# Patient Record
Sex: MALE | Race: WHITE | NOT HISPANIC OR LATINO | Employment: OTHER | URBAN - NONMETROPOLITAN AREA
[De-identification: names, ages, dates, MRNs, and addresses within clinical notes are randomized per-mention and may not be internally consistent; named-entity substitution may affect disease eponyms.]

---

## 2017-01-01 ENCOUNTER — TELEPHONE (OUTPATIENT)
Dept: UROLOGY | Facility: CLINIC | Age: 82
End: 2017-01-01

## 2017-01-01 ENCOUNTER — ANTI-COAG VISIT (OUTPATIENT)
Dept: CARDIOLOGY | Age: 82
End: 2017-01-01
Payer: MEDICARE

## 2017-01-01 DIAGNOSIS — N30.01 ACUTE CYSTITIS WITH HEMATURIA: ICD-10-CM

## 2017-01-01 DIAGNOSIS — Z95.0 PACEMAKER: Primary | ICD-10-CM

## 2017-01-01 DIAGNOSIS — N32.81 OAB (OVERACTIVE BLADDER): ICD-10-CM

## 2017-01-01 DIAGNOSIS — I48.20 ATRIAL FIBRILLATION, CHRONIC (HCC): ICD-10-CM

## 2017-01-01 DIAGNOSIS — I48.20 CHRONIC ATRIAL FIBRILLATION (HCC): ICD-10-CM

## 2017-01-01 DIAGNOSIS — I10 ESSENTIAL HYPERTENSION: ICD-10-CM

## 2017-01-01 DIAGNOSIS — R97.20 ELEVATED PROSTATE SPECIFIC ANTIGEN (PSA): Primary | ICD-10-CM

## 2017-01-01 LAB
INTERNATIONAL NORMALIZATION RATIO, POC: 3.1
PROTHROMBIN TIME, POC: NORMAL

## 2017-01-01 PROCEDURE — 93296 REM INTERROG EVL PM/IDS: CPT | Performed by: CLINICAL NURSE SPECIALIST

## 2017-01-01 PROCEDURE — 85610 PROTHROMBIN TIME: CPT | Performed by: CLINICAL NURSE SPECIALIST

## 2017-01-01 PROCEDURE — 93294 REM INTERROG EVL PM/LDLS PM: CPT | Performed by: CLINICAL NURSE SPECIALIST

## 2017-01-01 RX ORDER — FINASTERIDE 5 MG/1
5 TABLET, FILM COATED ORAL DAILY
Qty: 90 TABLET | Refills: 3 | Status: SHIPPED | OUTPATIENT
Start: 2017-01-01 | End: 2018-01-01 | Stop reason: SDUPTHER

## 2017-01-01 RX ORDER — AMLODIPINE BESYLATE 10 MG/1
10 TABLET ORAL DAILY
Qty: 30 TABLET | Refills: 0 | Status: SHIPPED | OUTPATIENT
Start: 2017-01-01 | End: 2018-01-01 | Stop reason: SDUPTHER

## 2017-01-05 ENCOUNTER — ANTI-COAG VISIT (OUTPATIENT)
Dept: CARDIOLOGY | Age: 82
End: 2017-01-05
Payer: MEDICARE

## 2017-01-05 DIAGNOSIS — I48.20 CHRONIC ATRIAL FIBRILLATION (HCC): ICD-10-CM

## 2017-01-05 LAB
INTERNATIONAL NORMALIZATION RATIO, POC: 3
PROTHROMBIN TIME, POC: NORMAL

## 2017-01-05 PROCEDURE — 85610 PROTHROMBIN TIME: CPT | Performed by: CLINICAL NURSE SPECIALIST

## 2017-02-09 ENCOUNTER — ANTI-COAG VISIT (OUTPATIENT)
Dept: CARDIOLOGY | Age: 82
End: 2017-02-09
Payer: MEDICARE

## 2017-02-09 DIAGNOSIS — I48.20 CHRONIC ATRIAL FIBRILLATION (HCC): ICD-10-CM

## 2017-02-09 LAB
INTERNATIONAL NORMALIZATION RATIO, POC: 1.9
PROTHROMBIN TIME, POC: NORMAL

## 2017-02-09 PROCEDURE — 85610 PROTHROMBIN TIME: CPT | Performed by: NURSE PRACTITIONER

## 2017-02-09 PROCEDURE — 1036F TOBACCO NON-USER: CPT | Performed by: NURSE PRACTITIONER

## 2017-02-17 DIAGNOSIS — E03.8 OTHER SPECIFIED HYPOTHYROIDISM: Primary | ICD-10-CM

## 2017-02-17 RX ORDER — LEVOTHYROXINE SODIUM 0.1 MG/1
100 TABLET ORAL DAILY
Qty: 90 TABLET | Refills: 1 | Status: SHIPPED | OUTPATIENT
Start: 2017-02-17 | End: 2017-06-06 | Stop reason: SDUPTHER

## 2017-02-21 ENCOUNTER — TELEPHONE (OUTPATIENT)
Dept: UROLOGY | Facility: CLINIC | Age: 82
End: 2017-02-21

## 2017-02-21 ENCOUNTER — OFFICE VISIT (OUTPATIENT)
Dept: FAMILY MEDICINE CLINIC | Facility: CLINIC | Age: 82
End: 2017-02-21

## 2017-02-21 VITALS
SYSTOLIC BLOOD PRESSURE: 132 MMHG | WEIGHT: 169 LBS | BODY MASS INDEX: 27.16 KG/M2 | HEART RATE: 74 BPM | RESPIRATION RATE: 20 BRPM | HEIGHT: 66 IN | TEMPERATURE: 97.7 F | DIASTOLIC BLOOD PRESSURE: 74 MMHG | OXYGEN SATURATION: 95 %

## 2017-02-21 DIAGNOSIS — Z78.9 NON-SMOKER: ICD-10-CM

## 2017-02-21 DIAGNOSIS — N13.8 BPH (BENIGN PROSTATIC HYPERTROPHY) WITH URINARY OBSTRUCTION: Primary | ICD-10-CM

## 2017-02-21 DIAGNOSIS — N40.1 BPH (BENIGN PROSTATIC HYPERTROPHY) WITH URINARY OBSTRUCTION: Primary | ICD-10-CM

## 2017-02-21 DIAGNOSIS — Z02.79 ENCOUNTER FOR ISSUANCE OF MEDICAL CERTIFICATE: Primary | ICD-10-CM

## 2017-02-21 PROCEDURE — 99213 OFFICE O/P EST LOW 20 MIN: CPT | Performed by: NURSE PRACTITIONER

## 2017-02-21 RX ORDER — FINASTERIDE 5 MG/1
5 TABLET, FILM COATED ORAL DAILY
Qty: 90 TABLET | Refills: 3 | Status: SHIPPED | OUTPATIENT
Start: 2017-02-21 | End: 2017-01-01 | Stop reason: SDUPTHER

## 2017-02-21 NOTE — PROGRESS NOTES
Sam Farr is a 89 y.o. male presents who today with   Chief Complaint   Patient presents with   • [facility] paperwork        No Known Allergies    HPI: Encounter for issuance of medical certificate  Presents with wife for completion for housebound status or permanent need for regular aid and attendee from [facility].  I explained to pt and wife that I do not think they qualify for this program because he has to be home bound.  He does have difficulty with certain tasks.  She wants to get this filled out and into the system.  He tells me that he needs assistance with cooking, and financial matters, and he is hard of hearing.  He says he just can't cook anymore.  He does take his own bath but wife does all the laundry he says he can not do it.  Has multiple chronic problems:  Osteoporosis stable with fosamax, HTN stable with amlodipine, iron deficiency stable with ferrous sulfate, hypothyroidism stable with levothyroxine, BPH stable with flomax, and Afib stable with coumadin and metoprolol.      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   • History of anticoagulant therapy    • HTN (hypertension)    • Hx of colon cancer, stage III    • Hypothyroidism    • Iron deficiency anemia    • Osteoporosis    • Pacemaker      Past Surgical History   Procedure Laterality Date   • Colon surgery       colon cancer stage 3   • Tympanoplasty       replaced   • Pacemaker implantation  2006   • Pacemaker replacement  2012     Social History     Social History   • Marital status:      Spouse name: N/A   • Number of children: N/A   • Years of education: N/A     Social History Main Topics   • Smoking status: Never Smoker   • Smokeless tobacco: Never Used   • Alcohol use Yes      Comment: wine  occassionally   • Drug use: No   • Sexual activity: Defer     Other Topics Concern   • None     Social History Narrative     Family History   Problem Relation Age of Onset   • Colon cancer Mother    • Heart attack Father    • COPD Sister    • No Known Problems Daughter    • Cancer Son    • Appendicitis Maternal Grandmother    • Heart attack Maternal Grandfather    • No Known Problems Paternal Grandmother    • No Known Problems Paternal Grandfather    • No Known Problems Sister    • No Known Problems Sister    • No Known Problems Sister    • Heart attack Son    • No Known Problems Son    • No Known Problems Son    • No Known Problems Daughter    • No Known Problems Daughter    • No Known Problems Daughter        Current Outpatient Prescriptions on File Prior to Visit   Medication Sig Dispense Refill   • alendronate (FOSAMAX) 70 MG tablet TAKE 1 TABLET EVERY WEEK 12 tablet 1   • amLODIPine (NORVASC) 10 MG tablet TAKE 1 TABLET EVERY DAY 90 tablet 1   • Calcium-Vitamin D (CALTRATE 600 PLUS-VIT D PO) Take  by mouth daily.     • dutasteride (AVODART) 0.5 MG capsule Daily.     • ferrous sulfate 325 (65 FE) MG tablet Take 325 mg by mouth daily with breakfast.     • FINASTERIDE PO Take 0.5 mg by mouth daily.     • levothyroxine (SYNTHROID, LEVOTHROID) 100 MCG tablet Take 1 tablet by mouth Daily. 90 tablet 1   • metoprolol tartrate (LOPRESSOR) 50 MG tablet Take 50 mg by mouth 2 (two) times a day. 1/2 tab bid     • tamsulosin (FLOMAX) 0.4 MG capsule 24 hr capsule Take 1 capsule by mouth daily.     • warfarin (COUMADIN) 5 MG tablet Take 5 mg by mouth daily. Dr. Small manages coumadin.       No current facility-administered medications on file prior to visit.         REVIEW OF SYMPTOMS: (Positives bolded)  General:  weight loss, fever, chills, night sweats, fatigue, appetite loss  HEENT:  blurry vision, eye pain, eye discharge, dry eyes, decreased hearing,   Respiratory: shortness of breath, cough, hemoptysis, wheezing,  "pleurisy,   Cardiovascular:  chest pain, PND, palpitation, edema, orthopnea, syncope, swelling of extremities  Gastro: Nausea, vomiting, diarrhea, hematemesis, abdominal pain, constipation  Genito: hematuria, dysuria, glycosuria, hesitancy, frequency, incontinence  Neuro:  Migraine, numbness, ataxia, tremor, vertigo, weakness, memory loss,  \"All other systems reviewed and negative, except as listed above.”    OBJECTIVE:  Constitutional:  Appearance-No acute distress, Consistent with stated age. Orientation- Oriented x 3, alert Posture-Not doubled over. Gait-Normal pace, normal arm movement with a cane. Posture- Normal Build and Nutrition-Well developed and well nourished.  General- Patient is pleasant and cooperative with the interview and exam.    Integumentary: General-No rashes, ulcers or lesions. No edema.  Palpation- Normal skin moisture/turgor. Skin is warm to touch, no increased warmth. Capillary refill is normal bilateral Upper and lower extremity.     Head/Neck: Head- normocephalic and atraumatic.  Neck- without visible/palpable lumps or pulsations.  Palpation- No bony tenderness about head/neck along frontal, occiptial, temporal, parietal, mastoid, jawline, zygoma, orbit or any other location.  NO temporal artery tenderness. No TMJ tenderness. Normal cervical ROM.   Neck Supple.  Thyroid-No thyromegaly, no nodules    Eye: Bilaterally PERRLA, EOMI.  No discharge.  Upper and lower eyelids are normal. Sclera/conjunctiva normal without discharge. Cornea is normal and clear. Lens is normal.  Eyeball appears normal. No ciliary flushing, no conjunctival injection.    ENMT:  Pinna- normal without tenderness or erythema.  External auditory canal Left- normal without erythema or discharge, no excessive cerumen. External auditory canal Right-normal without erythema or discharge, no excessive cerumen. TM left- Grey/pearly, normal light reflex and anatomy TM Right- Grey/pearly, normal light reflex and anatomy Hearing " Assessment-normal to conversational speech at 2-5 feet.  Nose and sinus-No sinus tenderness along frontal/maxillary region. External appearance normal and midline. Nares- bilateral quiet airflow, no discharge. Nasal mucosa- No bleeding noted and no ulcerations observed. Pink, moist. Turbinates non boggy. Lips- normal color, moist without cracks/lesions Oral Cavity/Palate- hard/soft palate intact without lesions, oral mucosa pink and moist. Dentition assessed and discussed appropriate oral care. Tongue normal midline.  Oropharynx- no pharyngeal erythema, Uvula midline. No post nasal drip. No exudate. Salivary glands- Non tender to palpation    CHEST/LUNG: Inspection- symmetric chest wall no pectus deformity. Normal effort, no distress, no use of accessory muscles. Palpation- nontender sternum, ribline.  No abnormal pulsations. Auscultation- Breath sounds normal throughout all lung fields.  Normal tracheal sounds, Normal bronchial sounds overlying sternum, Bronchovessicular sounds normal between scapulae posteriorly, Normal vessicular breath sounds heard throughout periphery. Lungs are clear today. Adventitious sounds- No wheezes, rales, rhonchi.     CARDIOVASCULAR:  Carotid artery- normal, no bruits or abnormal pulsations. Jugular vein- no pulsations. Palpation/Percussion- Normal PMI, no palpable thrill  Auscultation- Regular rate and rhythm. No murmur noted in sitting, supine positions. Extremities- no digital clubbing, cyanosis, edema, increased warmth.    Peripheral Vascular: Upper extremity Left- Normal temperature with pink nailbeds and no ulcerations.  Upper extremity Right- Normal temperature with pink nailbeds and no ulcerations.  Lower extremity- Normal temperature with pink nailbeds and no ulcerations. DP pulses 2+ bilaterally.  Pedal hair intact.  Normal capillary refill. Edema- No edema.    Musculoskeletal: Generalized-No generalized swelling or edema of extremities, no digital clubbing or cyanosis,  neurovascularly intact all four extremities.  Upper extremity- Symmetrical posture.  No visible deformity.  Normal sensation along medial and lateral upper extremity proximally and distally.  NO tenderness overlying shoulder, lateral/medial epicondyle.  5/5 and strength 5/5 bilateral UE.  Elbow palpated, no tenderness overlying olecranon.  Normal supination, pronation to active/passive ROM and to resisted rotation. Bicep insertion/tricep insertion appear normal without obvious pathology. Rotator cuff evaluated and intact.  Normal wrist ROM bilaterally. Normal hand movement, intrinsic muscles of hands normal. No tenderness to palpation of hands/wrists/elbows.  Lower extremity- Not tender to palpation, no pain, no swelling, edema or erythema of surrounding tissue, normal strength and tone.  Normal appearing hip ROM bilaterally without pain.  Knee ROM normal at 0-120 degrees. No tenderness overlying trochanters, no tenderness about patella, quad tendon, patellar tendon.  No tenderness at tibial tuberosity. Ankle normal ROM not tender to palpation along medial/lateral malleolus. Normal movement of toes, no tenderness bilateral feet/toes.  Normal foot type. Calves symmetrical.  Stretching demonstrated today.  Spine/Ribs- No deformities, masses or tenderness, no known fractures, normal strength, Normal ROM. Normal stability No tenderness along C/T/L spine.  Normal appearing ROM about spine.      Neurological: General- Moves all 4 extremities symmetrically. Symmetrical face and body posture. Cranial nerves- individually evaluated II-XII and intact. PERRLA, Normal EOMI, visual/special senses appear intact, Face is symmetrical and normal sensation/movement, normal tongue, normal strength/posture of neck musculature. Balance- Romberg intact.  Reflexes- ntact with DTR 2+ patellar, Achilles, bicep, brachial,tricep. Ankle clonus normal with 2 beats.  Strength- 5/5 bilateral UE and LE. Soft touch- intact bilateral UE and LE.   Temperature sensation- intact bilateral UE and LE. Cerebellar testing-Rapid alternating movements intact.  Heel shin intact. Able to walk normal gait, normal heel toe walking. Neck- supple.      Neuropsych: Oriented- Person, place, time. (AAOx3), Mood/affect- normal and congruent. Able to articulate well. Speech-Normal speech, normal rate, normal tone, normal use of language, volume and coherence.  Thought content- normal with ability to perform basic computations and apply abstract thought/reason. Associations- intact, no SI/HI, no hallucinations, delusions, obsessions.  Judgment/insight- Appropriate. Memory-Recall intact, remote and recent memory intact. Knowledge- Age appropriate fund of knowledge, concentration and attention span normal.    Lymphatic: Head/Neck- normal size and non tender to palpation. Axillary- Head and neck LN are normal size and non tender to palpation. Femoral and Inguinal- normal size and non tender to palpation.      Assessment/Plan:  Sam was seen today for [facility] paperwork.    Diagnoses and all orders for this visit:    Encounter for issuance of medical certificate:  See scanned sheet    Non-smoker     MMSE, Functional Capacity, PHQ-9 all completed see flow sheets    Return if symptoms worsen or fail to improve., 6 months for chronic problems    Lisbet Whyte, DNP, APRN-BC  02/21/2017

## 2017-02-21 NOTE — TELEPHONE ENCOUNTER
Called and spoke with patient wife and she says that they do get a 90 day supply at [facility] mail order.  Because she did advise to me that it is cheaper for them that way through a mail order. ML

## 2017-02-22 ENCOUNTER — TELEPHONE (OUTPATIENT)
Dept: CARDIOLOGY | Age: 82
End: 2017-02-22

## 2017-02-23 DIAGNOSIS — I48.20 ATRIAL FIBRILLATION, CHRONIC (HCC): ICD-10-CM

## 2017-02-23 DIAGNOSIS — Z95.0 PACEMAKER: Primary | ICD-10-CM

## 2017-02-23 PROCEDURE — 93296 REM INTERROG EVL PM/IDS: CPT | Performed by: NURSE PRACTITIONER

## 2017-02-23 PROCEDURE — 93294 REM INTERROG EVL PM/LDLS PM: CPT | Performed by: NURSE PRACTITIONER

## 2017-03-01 ENCOUNTER — OFFICE VISIT (OUTPATIENT)
Dept: FAMILY MEDICINE CLINIC | Facility: CLINIC | Age: 82
End: 2017-03-01

## 2017-03-01 VITALS
RESPIRATION RATE: 14 BRPM | TEMPERATURE: 97.6 F | WEIGHT: 166.8 LBS | BODY MASS INDEX: 26.81 KG/M2 | HEIGHT: 66 IN | DIASTOLIC BLOOD PRESSURE: 64 MMHG | OXYGEN SATURATION: 97 % | HEART RATE: 74 BPM | SYSTOLIC BLOOD PRESSURE: 106 MMHG

## 2017-03-01 DIAGNOSIS — L08.9 SKIN INFECTION: Primary | ICD-10-CM

## 2017-03-01 PROCEDURE — 99213 OFFICE O/P EST LOW 20 MIN: CPT | Performed by: NURSE PRACTITIONER

## 2017-03-01 RX ORDER — AMOXICILLIN AND CLAVULANATE POTASSIUM 875; 125 MG/1; MG/1
1 TABLET, FILM COATED ORAL 2 TIMES DAILY
Qty: 14 TABLET | Refills: 0 | Status: SHIPPED | OUTPATIENT
Start: 2017-03-01 | End: 2017-03-08

## 2017-03-01 NOTE — PROGRESS NOTES
Chief Complaint   Patient presents with   • Follow-up     Patient said that his belly button have had some discharge from it. He said it has been like this for a couple weeks.         No Known Allergies    HPI: Sam Farr is a 89 y.o. male presents today with complaints of drainage and pain from his belly button that started last week.  He didn't think it was anything and has been putting antibiotic ointment but it continues to worsen and today the whole belly button and area around belly button is red, warm, and tender.  Rates overall 7/10.  Has multiple chronic problems such as: atrial fib stable with  Warfarin and metoprolol, BPH stable with tamsulosin, hypothyroidism stable with levopthyrioxine, HTN stalbe with amlodipine, iron def anemia stable with ferrous calcium + D, and avodart and finasteride.      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   • History of anticoagulant therapy    • HTN (hypertension)    • Hx of colon cancer, stage III    • Hypothyroidism    • Iron deficiency anemia    • Osteoporosis    • Pacemaker      Past Surgical History   Procedure Laterality Date   • Colon surgery       colon cancer stage 3   • Tympanoplasty       replaced   • Pacemaker implantation  2006   • Pacemaker replacement  2012     Social History     Social History   • Marital status:      Spouse name: N/A   • Number of children: N/A   • Years of education: N/A     Social History Main Topics   • Smoking status: Never Smoker   • Smokeless tobacco: Never Used   • Alcohol use Yes      Comment: wine occassionally   • Drug use: No   • Sexual activity: Defer     Other Topics Concern   • None     Social History Narrative     Family History   Problem Relation Age of Onset   • Colon cancer  Mother    • Heart attack Father    • COPD Sister    • No Known Problems Daughter    • Cancer Son    • Appendicitis Maternal Grandmother    • Heart attack Maternal Grandfather    • No Known Problems Paternal Grandmother    • No Known Problems Paternal Grandfather    • No Known Problems Sister    • No Known Problems Sister    • No Known Problems Sister    • Heart attack Son    • No Known Problems Son    • No Known Problems Son    • No Known Problems Daughter    • No Known Problems Daughter    • No Known Problems Daughter        Current Outpatient Prescriptions on File Prior to Visit   Medication Sig Dispense Refill   • alendronate (FOSAMAX) 70 MG tablet TAKE 1 TABLET EVERY WEEK 12 tablet 1   • amLODIPine (NORVASC) 10 MG tablet TAKE 1 TABLET EVERY DAY 90 tablet 1   • Calcium-Vitamin D (CALTRATE 600 PLUS-VIT D PO) Take  by mouth daily.     • dutasteride (AVODART) 0.5 MG capsule Daily.     • ferrous sulfate 325 (65 FE) MG tablet Take 325 mg by mouth daily with breakfast.     • finasteride (PROSCAR) 5 MG tablet Take 1 tablet by mouth Daily. 90 tablet 3   • levothyroxine (SYNTHROID, LEVOTHROID) 100 MCG tablet Take 1 tablet by mouth Daily. 90 tablet 1   • metoprolol tartrate (LOPRESSOR) 50 MG tablet Take 50 mg by mouth 2 (two) times a day. 1/2 tab bid     • tamsulosin (FLOMAX) 0.4 MG capsule 24 hr capsule Take 1 capsule by mouth daily.     • warfarin (COUMADIN) 5 MG tablet Take 5 mg by mouth daily. Dr. Small manages coumadin.       No current facility-administered medications on file prior to visit.         REVIEW OF SYMPTOMS: (Positives bolded)  General:  weight loss, fever, chills, night sweats, fatigue, appetite loss  HEENT:  blurry vision, eye pain, eye discharge, dry eyes, decreased vision, sore throat tinnitus, bloody nose, hearin gloss, sinus pain/pressure, ear pain/pressure.   Respiratory: shortness of breath, cough, hemoptysis, wheezing, pleurisy,   Cardiovascular:  chest pain, PND, palpitation, edema, orthopnea,  "syncope, swelling of extremities  Skin:  rash, pruritis, sores, nail changes, skin thickening, change in wart/mole, itching, rash, new lesions, pruritus, nail changes  Neuro:  Migraine, numbness, ataxia, tremor, vertigo, weakness, memory loss,  \"All other systems reviewed and negative, except as listed above.”    OBJECTIVE:  Constitutional:  Appearance-No acute distress, Consistent with stated age. Orientation- Oriented x 3, alert Posture-Not doubled over. Gait-Normal pace, normal arm movement. Posture- Normal Build and Nutrition-Well developed and well nourished.  General- Patient is pleasant and cooperative with the interview and exam.    Integumentary: General-No rashes, ulcers or lesions. No edema.  Palpation- Normal skin moisture/turgor. Skin around the umbilicus is red and warm to touch, there is purulent  Drainage coming from the umbilicus.  Skin culture for diatharix was obtained.     CHEST/LUNG: Inspection- symmetric chest wall no pectus deformity. Normal effort, no distress, no use of accessory muscles. Palpation- nontender sternum, ribline.  No abnormal pulsations. Auscultation- Breath sounds normal throughout all lung fields.  Normal tracheal sounds, Normal bronchial sounds overlying sternum, Bronchovessicular sounds normal between scapulae posteriorly, Normal vessicular breath sounds heard throughout periphery. Lungs are clear today. Adventitious sounds- No wheezes, rales, rhonchi.     CARDIOVASCULAR:  Carotid artery- normal, no bruits or abnormal pulsations. Jugular vein- no pulsations. Palpation/Percussion- Normal PMI, no palpable thrill  Auscultation- Regular rate and rhythm. No murmur noted in sitting, supine positions. Extremities- no digital clubbing, cyanosis, edema, increased warmth.    ABDOMEN: Inspection- normal and no visible pulsations. Normal contour. Auscultation- Bowel sounds normal, no abdominal bruits. Palpation/Percussion- soft, tender around umbilicus, no rebound tenderness, no " rigidity (guarding), no jar tenderness, no masses. Has purulent drainage from the umbilicus.   Liver-no hepatomegaly, Spleen - no splenomegaly, Hernias- none. Rectal not examined.     Peripheral Vascular: Upper extremity Left- Normal temperature with pink nailbeds and no ulcerations.  Upper extremity Right- Normal temperature with pink nailbeds and no ulcerations.  Lower extremity- Normal temperature with pink nailbeds and no ulcerations. DP pulses 2+ bilaterally.  Pedal hair intact.  Normal capillary refill. Edema- No edema.    Lymphatic: Head/Neck- normal size and non tender to palpation. Axillary- Head and neck LN are normal size and non tender to palpation. Femoral and Inguinal- normal size and non tender to palpation.      Assessment/Plan:  Sam was seen today for follow-up.    Diagnoses and all orders for this visit:    Skin infection Umbilicus  -     amoxicillin-clavulanate (AUGMENTIN) 875-125 MG per tablet; Take 1 tablet by mouth 2 (Two) Times a Day for 7 days.       -   Use dish soap and guaze and clean area the area twice daily.  Apply antibiotic ointment of choice to affected area       -  Use good hand washing techniques    Return in about 1 week (around 3/8/2017), or if symptoms worsen or fail to improve.    Lisbet Whyte, DNP, APRN-BC  03/01/2017

## 2017-03-09 ENCOUNTER — ANTI-COAG VISIT (OUTPATIENT)
Dept: CARDIOLOGY | Age: 82
End: 2017-03-09

## 2017-03-09 DIAGNOSIS — I48.20 CHRONIC ATRIAL FIBRILLATION (HCC): ICD-10-CM

## 2017-03-09 LAB
INTERNATIONAL NORMALIZATION RATIO, POC: 2
PROTHROMBIN TIME, POC: NORMAL

## 2017-03-09 PROCEDURE — 1036F TOBACCO NON-USER: CPT | Performed by: CLINICAL NURSE SPECIALIST

## 2017-03-09 PROCEDURE — 99999 PR OFFICE/OUTPT VISIT,PROCEDURE ONLY: CPT | Performed by: CLINICAL NURSE SPECIALIST

## 2017-03-31 DIAGNOSIS — C18.9 MALIGNANT NEOPLASM OF COLON, UNSPECIFIED SITE: Primary | ICD-10-CM

## 2017-04-03 ENCOUNTER — LAB (OUTPATIENT)
Dept: ONCOLOGY | Facility: CLINIC | Age: 82
End: 2017-04-03

## 2017-04-03 ENCOUNTER — OFFICE VISIT (OUTPATIENT)
Dept: ONCOLOGY | Facility: CLINIC | Age: 82
End: 2017-04-03

## 2017-04-03 VITALS
DIASTOLIC BLOOD PRESSURE: 68 MMHG | TEMPERATURE: 97.1 F | HEIGHT: 66 IN | RESPIRATION RATE: 18 BRPM | OXYGEN SATURATION: 94 % | HEART RATE: 79 BPM | SYSTOLIC BLOOD PRESSURE: 118 MMHG | WEIGHT: 168.4 LBS | BODY MASS INDEX: 27.06 KG/M2

## 2017-04-03 DIAGNOSIS — C18.9 MALIGNANT NEOPLASM OF COLON, UNSPECIFIED SITE: ICD-10-CM

## 2017-04-03 DIAGNOSIS — C18.9 MALIGNANT NEOPLASM OF COLON, UNSPECIFIED PART OF COLON (HCC): Primary | ICD-10-CM

## 2017-04-03 DIAGNOSIS — C18.2 MALIGNANT NEOPLASM OF ASCENDING COLON (HCC): Primary | ICD-10-CM

## 2017-04-03 DIAGNOSIS — C18.9 MALIGNANT NEOPLASM OF COLON, UNSPECIFIED SITE: Primary | ICD-10-CM

## 2017-04-03 LAB
ALBUMIN SERPL-MCNC: 4.6 G/DL (ref 3.5–5)
ALBUMIN/GLOB SERPL: 1.4 G/DL
ALP SERPL-CCNC: 46 U/L (ref 38–126)
ALT SERPL W P-5'-P-CCNC: 30 U/L (ref 21–72)
ANION GAP SERPL CALCULATED.3IONS-SCNC: 12 MMOL/L
AST SERPL-CCNC: 34 U/L (ref 5–40)
AUTO MIXED CELLS #: 0.6 10*3/UL (ref 0.1–1.5)
AUTO MIXED CELLS %: 7.3 % (ref 0.2–15.1)
BILIRUB SERPL-MCNC: 0.8 MG/DL (ref 0.2–1.3)
BUN BLD-MCNC: 23 MG/DL (ref 9–21)
BUN/CREAT SERPL: 23 (ref 7–25)
CALCIUM SPEC-SCNC: 9.1 MG/DL (ref 8.4–10.2)
CHLORIDE SERPL-SCNC: 100 MMOL/L (ref 98–107)
CO2 SERPL-SCNC: 30 MMOL/L (ref 22–30)
CREAT BLD-MCNC: 1 MG/DL (ref 0.8–1.5)
ERYTHROCYTE [DISTWIDTH] IN BLOOD BY AUTOMATED COUNT: 14.4 % (ref 11.5–14.5)
GFR SERPL CREATININE-BSD FRML MDRD: 70 ML/MIN/1.73
GLOBULIN UR ELPH-MCNC: 3.2 GM/DL
GLUCOSE BLD-MCNC: 118 MG/DL (ref 75–110)
HCT VFR BLD AUTO: 42.3 % (ref 42–52)
HGB BLD-MCNC: 13.6 G/DL (ref 14–18)
LYMPHOCYTES # BLD AUTO: 2.8 10*3/MM3 (ref 0.8–7)
LYMPHOCYTES NFR BLD AUTO: 33.5 % (ref 10–58.5)
MCH RBC QN AUTO: 32.8 PG (ref 27–31)
MCHC RBC AUTO-ENTMCNC: 32.2 G/DL (ref 33–37)
MCV RBC AUTO: 102 FL (ref 80–94)
NEUTROPHILS # BLD AUTO: 5 10*3/MM3 (ref 2–7.8)
NEUTROPHILS NFR BLD AUTO: 59.2 % (ref 37–92)
PLATELET # BLD AUTO: 197 10*3/MM3 (ref 130–400)
PMV BLD AUTO: 8.4 FL (ref 6–12)
POTASSIUM BLD-SCNC: 4.3 MMOL/L (ref 3.6–5)
PROT SERPL-MCNC: 7.8 G/DL (ref 6.3–8.2)
RBC # BLD AUTO: 4.15 10*6/MM3 (ref 4.7–6.1)
SODIUM BLD-SCNC: 142 MMOL/L (ref 137–145)
WBC NRBC COR # BLD: 8.4 10*3/MM3 (ref 4.8–10.8)

## 2017-04-03 PROCEDURE — 85025 COMPLETE CBC W/AUTO DIFF WBC: CPT | Performed by: INTERNAL MEDICINE

## 2017-04-03 PROCEDURE — 99214 OFFICE O/P EST MOD 30 MIN: CPT | Performed by: INTERNAL MEDICINE

## 2017-04-03 PROCEDURE — 80053 COMPREHEN METABOLIC PANEL: CPT | Performed by: INTERNAL MEDICINE

## 2017-04-03 PROCEDURE — 36415 COLL VENOUS BLD VENIPUNCTURE: CPT | Performed by: INTERNAL MEDICINE

## 2017-04-03 NOTE — PROGRESS NOTES
[facility]  HEMATOLOGY & ONCOLOGY        Subjective     VISIT DIAGNOSIS:   No diagnosis found.    REASON FOR VISIT:     Chief Complaint   Patient presents with   • Follow-up     Colon Ca:  He is here for f/u visit today. No new c/o today        HEMATOLOGY / ONCOLOGY HISTORY: Mr. Farr is a 89 year old male who was seen for iron deficiency anemia. His hemoglobin had improved although colonoscopy found  colon cancer. He had a colectomy finding colon cancer with 5/11 lymph nodes positive for malignancy. He is stage III. He will get benefit  from adjuvant chemotherapy but considering his old age, he has decided not to get any chemo, and decision was made for surveillance  only.   No history exists.       Cancer Staging Information:  No matching staging information was found for the patient.      INTERVAL HISTORY  Patient ID: Sam Farr is a 89 y.o. year old male         Review of Systems   Constitutional: Negative.    HENT: Negative.    Eyes: Negative.    Respiratory: Negative.    Cardiovascular: Negative.    Gastrointestinal: Negative.    Endocrine: Negative.    Genitourinary: Negative.    Musculoskeletal: Negative.    Skin: Negative.    Allergic/Immunologic: Negative.    Neurological: Negative.    Hematological: Negative.    Psychiatric/Behavioral: Negative.             Medications:    Current Outpatient Prescriptions   Medication Sig Dispense Refill   • alendronate (FOSAMAX) 70 MG tablet TAKE 1 TABLET EVERY WEEK 12 tablet 1   • amLODIPine (NORVASC) 10 MG tablet TAKE 1 TABLET EVERY DAY 90 tablet 1   • Calcium-Vitamin D (CALTRATE 600 PLUS-VIT D PO) Take  by mouth daily.     • dutasteride (AVODART) 0.5 MG capsule Daily.     • ferrous sulfate 325 (65 FE) MG tablet Take 325 mg by mouth daily with breakfast.     • finasteride (PROSCAR) 5 MG tablet Take 1 tablet by mouth Daily. 90 tablet 3   • levothyroxine (SYNTHROID, LEVOTHROID) 100 MCG tablet Take 1 tablet by mouth Daily. 90 tablet 1   •  metoprolol tartrate (LOPRESSOR) 50 MG tablet Take 50 mg by mouth 2 (two) times a day. 1/2 tab bid     • tamsulosin (FLOMAX) 0.4 MG capsule 24 hr capsule Take 1 capsule by mouth daily.     • warfarin (COUMADIN) 5 MG tablet Take 5 mg by mouth daily. Dr. Small manages coumadin.       No current facility-administered medications for this visit.        ALLERGIES:  No Known Allergies    Objective      @VITALS    Current Status 4/3/2017   ECOG score 1       General Appearance: Patient is awake, alert, oriented and in no acute distress. Patient is welldeveloped, wellnourished, and appears stated age.  HEENT: Normocephalic. Sclerae clear, conjunctiva pink, extraocular movements intact, pupils, round, reactive to light and  accommodation. Mouth and throat are clear with moist oral mucosa.  NECK: Supple, no jugular venous distention, thyroid not enlarged.  LYMPH: No cervical, supraclavicular, axillary, or inguinal lymphadenopathy.  CHEST: Equal bilateral expansion, AP  diameter normal, resonant percussion note  LUNGS: Good air movement, no rales, rhonchi, rubs or wheezes with auscultation  CARDIO: Regular sinus rhythm, no murmurs, gallops or rubs.  ABDOMEN: Nondistended, soft, No tenderness, no guarding, no rebound, No hepatosplenomegaly. No abdominal masses. Bowel sounds positive. No hernia  GENITALIA: Not examined.  BREASTS: Not examined.  MUSKEL: No joint swelling, decreased motion, or inflammation  EXTREMS: No edema, clubbing, cyanosis, No varicose veins.  NEURO: Grossly nonfocal, Gait is coordinated and smooth, Cognition is preserved.  SKIN: No rashes, no ecchymoses, no petechia.  PSYCH: Oriented to time, place and person. Memory is preserved. Mood and affect appear normal      RECENT LABS:  Lab on 04/03/2017   Component Date Value Ref Range Status   • Glucose 04/03/2017 118* 75 - 110 mg/dL Final   • BUN 04/03/2017 23* 9 - 21 mg/dL Final   • Creatinine 04/03/2017 1.00  0.80 - 1.50 mg/dL Final   • Sodium 04/03/2017 142   137 - 145 mmol/L Final   • Potassium 04/03/2017 4.3  3.6 - 5.0 mmol/L Final   • Chloride 04/03/2017 100  98 - 107 mmol/L Final   • CO2 04/03/2017 30.0  22.0 - 30.0 mmol/L Final   • Calcium 04/03/2017 9.1  8.4 - 10.2 mg/dL Final   • Total Protein 04/03/2017 7.8  6.3 - 8.2 g/dL Final   • Albumin 04/03/2017 4.60  3.50 - 5.00 g/dL Final   • ALT (SGPT) 04/03/2017 30  21 - 72 U/L Final   • AST (SGOT) 04/03/2017 34  5 - 40 U/L Final   • Alkaline Phosphatase 04/03/2017 46  38 - 126 U/L Final   • Total Bilirubin 04/03/2017 0.8  0.2 - 1.3 mg/dL Final   • eGFR Non African Amer 04/03/2017 70  >60 mL/min/1.73 Final   • Globulin 04/03/2017 3.2  gm/dL Final   • A/G Ratio 04/03/2017 1.4  g/dL Final   • BUN/Creatinine Ratio 04/03/2017 23.0  7.0 - 25.0 Final   • Anion Gap 04/03/2017 12.0  mmol/L Final   • WBC 04/03/2017 8.40  4.80 - 10.80 10*3/mm3 Final   • RBC 04/03/2017 4.15* 4.70 - 6.10 10*6/mm3 Final   • Hemoglobin 04/03/2017 13.6* 14.0 - 18.0 g/dL Final   • Hematocrit 04/03/2017 42.3  42.0 - 52.0 % Final   • MCV 04/03/2017 102.0* 80.0 - 94.0 fL Final   • MCH 04/03/2017 32.8* 27.0 - 31.0 pg Final   • MCHC 04/03/2017 32.2* 33.0 - 37.0 g/dL Final   • RDW 04/03/2017 14.4  11.5 - 14.5 % Final   • MPV 04/03/2017 8.4  6.0 - 12.0 fL Final   • Platelets 04/03/2017 197  130 - 400 10*3/mm3 Final   • Neutrophil % 04/03/2017 59.2  37.0 - 92.0 % Final   • Lymphocyte % 04/03/2017 33.5  10.0 - 58.5 % Final   • Auto Mixed Cells % 04/03/2017 7.3  0.2 - 15.1 % Final   • Neutrophils, Absolute 04/03/2017 5.00  2.00 - 7.80 10*3/mm3 Final   • Lymphocytes, Absolute 04/03/2017 2.80  0.80 - 7.00 10*3/mm3 Final   • Auto Mixed Cells # 04/03/2017 0.60  0.10 - 1.50 10*3/uL Final       RADIOLOGY:  No results found.         Assessment/Plan      Colon Ca, 5/11+ LN, S/P resection. Because of age, he did not consider any chemo.  Currently on OBSERVATION. Recent CT scan 11/2016: Indeterminate 7mm lesion in L pelvic area.  Indeterminate L4 lesion And enlarged  Prostate with NL PSA.  CEA drawn 11/23 is pending.    Continue OBSERVATION. RTC 4 months with repeat CEA and LFTs.                     Negro Clements MD    4/3/2017    3:46 PM             [facility]  HEMATOLOGY & ONCOLOGY        Subjective     VISIT DIAGNOSIS:   No diagnosis found.    REASON FOR VISIT:     Chief Complaint   Patient presents with   • Follow-up     Colon Ca:  He is here for f/u visit today. No new c/o today        HEMATOLOGY / ONCOLOGY HISTORY: Mr. Farr is a 89 year old male who was seen for iron deficiency anemia. His hemoglobin had improved although colonoscopy found  colon cancer. He had a colectomy finding colon cancer with 5/11 lymph nodes positive for malignancy. He is stage III. He will get benefit  from adjuvant chemotherapy but considering his old age, he has decided not to get any chemo, and decision was made for surveillance  only.   No history exists.       Cancer Staging Information:  No matching staging information was found for the patient.      INTERVAL HISTORY  Patient ID: Sam Farr is a 89 y.o. year old male         Review of Systems   Constitutional: Negative.    HENT: Negative.    Eyes: Negative.    Respiratory: Negative.    Cardiovascular: Negative.    Gastrointestinal: Negative.    Endocrine: Negative.    Genitourinary: Negative.    Musculoskeletal: Negative.    Skin: Negative.    Allergic/Immunologic: Negative.    Neurological: Negative.    Hematological: Negative.    Psychiatric/Behavioral: Negative.             Medications:    Current Outpatient Prescriptions   Medication Sig Dispense Refill   • alendronate (FOSAMAX) 70 MG tablet TAKE 1 TABLET EVERY WEEK 12 tablet 1   • amLODIPine (NORVASC) 10 MG tablet TAKE 1 TABLET EVERY DAY 90 tablet 1   • Calcium-Vitamin D (CALTRATE 600 PLUS-VIT D PO) Take  by mouth daily.     • dutasteride (AVODART) 0.5 MG capsule Daily.     • ferrous sulfate 325 (65 FE) MG tablet Take 325 mg by mouth daily with breakfast.      • finasteride (PROSCAR) 5 MG tablet Take 1 tablet by mouth Daily. 90 tablet 3   • levothyroxine (SYNTHROID, LEVOTHROID) 100 MCG tablet Take 1 tablet by mouth Daily. 90 tablet 1   • metoprolol tartrate (LOPRESSOR) 50 MG tablet Take 50 mg by mouth 2 (two) times a day. 1/2 tab bid     • tamsulosin (FLOMAX) 0.4 MG capsule 24 hr capsule Take 1 capsule by mouth daily.     • warfarin (COUMADIN) 5 MG tablet Take 5 mg by mouth daily. Dr. Small manages coumadin.       No current facility-administered medications for this visit.        ALLERGIES:  No Known Allergies    Objective      @VITALS    Current Status 4/3/2017   ECOG score 1       General Appearance: Patient is awake, alert, oriented and in no acute distress. Patient is welldeveloped, wellnourished, and appears stated age.  HEENT: Normocephalic. Sclerae clear, conjunctiva pink, extraocular movements intact, pupils, round, reactive to light and  accommodation. Mouth and throat are clear with moist oral mucosa.  NECK: Supple, no jugular venous distention, thyroid not enlarged.  LYMPH: No cervical, supraclavicular, axillary, or inguinal lymphadenopathy.  CHEST: Equal bilateral expansion, AP  diameter normal, resonant percussion note  LUNGS: Good air movement, no rales, rhonchi, rubs or wheezes with auscultation  CARDIO: Regular sinus rhythm, no murmurs, gallops or rubs.  ABDOMEN: Nondistended, soft, No tenderness, no guarding, no rebound, No hepatosplenomegaly. No abdominal masses. Bowel sounds positive. No hernia  GENITALIA: Not examined.  BREASTS: Not examined.  MUSKEL: No joint swelling, decreased motion, or inflammation  EXTREMS: No edema, clubbing, cyanosis, No varicose veins.  NEURO: Grossly nonfocal, Gait is coordinated and smooth, Cognition is preserved.  SKIN: No rashes, no ecchymoses, no petechia.  PSYCH: Oriented to time, place and person. Memory is preserved. Mood and affect appear normal      RECENT LABS:  Lab on 04/03/2017   Component Date Value Ref Range  Status   • Glucose 04/03/2017 118* 75 - 110 mg/dL Final   • BUN 04/03/2017 23* 9 - 21 mg/dL Final   • Creatinine 04/03/2017 1.00  0.80 - 1.50 mg/dL Final   • Sodium 04/03/2017 142  137 - 145 mmol/L Final   • Potassium 04/03/2017 4.3  3.6 - 5.0 mmol/L Final   • Chloride 04/03/2017 100  98 - 107 mmol/L Final   • CO2 04/03/2017 30.0  22.0 - 30.0 mmol/L Final   • Calcium 04/03/2017 9.1  8.4 - 10.2 mg/dL Final   • Total Protein 04/03/2017 7.8  6.3 - 8.2 g/dL Final   • Albumin 04/03/2017 4.60  3.50 - 5.00 g/dL Final   • ALT (SGPT) 04/03/2017 30  21 - 72 U/L Final   • AST (SGOT) 04/03/2017 34  5 - 40 U/L Final   • Alkaline Phosphatase 04/03/2017 46  38 - 126 U/L Final   • Total Bilirubin 04/03/2017 0.8  0.2 - 1.3 mg/dL Final   • eGFR Non African Amer 04/03/2017 70  >60 mL/min/1.73 Final   • Globulin 04/03/2017 3.2  gm/dL Final   • A/G Ratio 04/03/2017 1.4  g/dL Final   • BUN/Creatinine Ratio 04/03/2017 23.0  7.0 - 25.0 Final   • Anion Gap 04/03/2017 12.0  mmol/L Final   • WBC 04/03/2017 8.40  4.80 - 10.80 10*3/mm3 Final   • RBC 04/03/2017 4.15* 4.70 - 6.10 10*6/mm3 Final   • Hemoglobin 04/03/2017 13.6* 14.0 - 18.0 g/dL Final   • Hematocrit 04/03/2017 42.3  42.0 - 52.0 % Final   • MCV 04/03/2017 102.0* 80.0 - 94.0 fL Final   • MCH 04/03/2017 32.8* 27.0 - 31.0 pg Final   • MCHC 04/03/2017 32.2* 33.0 - 37.0 g/dL Final   • RDW 04/03/2017 14.4  11.5 - 14.5 % Final   • MPV 04/03/2017 8.4  6.0 - 12.0 fL Final   • Platelets 04/03/2017 197  130 - 400 10*3/mm3 Final   • Neutrophil % 04/03/2017 59.2  37.0 - 92.0 % Final   • Lymphocyte % 04/03/2017 33.5  10.0 - 58.5 % Final   • Auto Mixed Cells % 04/03/2017 7.3  0.2 - 15.1 % Final   • Neutrophils, Absolute 04/03/2017 5.00  2.00 - 7.80 10*3/mm3 Final   • Lymphocytes, Absolute 04/03/2017 2.80  0.80 - 7.00 10*3/mm3 Final   • Auto Mixed Cells # 04/03/2017 0.60  0.10 - 1.50 10*3/uL Final       RADIOLOGY:  No results found.         Assessment/Plan      Colon Ca, 5/11+ LN, S/P resection.  Because of age, he did not consider any chemo.  Currently on OBSERVATION. Recent CT scan 11/2016: Indeterminate 7mm lesion in L pelvic area.  Indeterminate L4 lesion And enlarged Prostate with NL PSA.  CEA drawn 11/23 is normal.    Continue OBSERVATION. RTC 4 months with repeat CEA and LFTs.    At his age I probably was not scoped him as well as not reimage him considering the fact that clinically he is doing well and is stable and able to perform his activities of daily living.  He does not have any clinical or clinical evidence of disease recurrence.                     Negro Clements MD    4/3/2017    4:33 PM

## 2017-04-05 LAB — CEA SERPL-MCNC: 0.77 NG/ML (ref 0–5)

## 2017-04-13 ENCOUNTER — ANTI-COAG VISIT (OUTPATIENT)
Dept: CARDIOLOGY | Age: 82
End: 2017-04-13
Payer: MEDICARE

## 2017-04-13 DIAGNOSIS — I48.20 CHRONIC ATRIAL FIBRILLATION (HCC): ICD-10-CM

## 2017-04-13 LAB
INTERNATIONAL NORMALIZATION RATIO, POC: 2.7
PROTHROMBIN TIME, POC: NORMAL

## 2017-04-13 PROCEDURE — 1036F TOBACCO NON-USER: CPT | Performed by: CLINICAL NURSE SPECIALIST

## 2017-04-13 PROCEDURE — 85610 PROTHROMBIN TIME: CPT | Performed by: CLINICAL NURSE SPECIALIST

## 2017-05-31 ENCOUNTER — TELEPHONE (OUTPATIENT)
Dept: CARDIOLOGY | Age: 82
End: 2017-05-31

## 2017-05-31 DIAGNOSIS — Z95.0 PACEMAKER: Primary | ICD-10-CM

## 2017-05-31 DIAGNOSIS — I48.20 ATRIAL FIBRILLATION, CHRONIC (HCC): ICD-10-CM

## 2017-05-31 PROCEDURE — 93294 REM INTERROG EVL PM/LDLS PM: CPT | Performed by: CLINICAL NURSE SPECIALIST

## 2017-05-31 PROCEDURE — 93296 REM INTERROG EVL PM/IDS: CPT | Performed by: CLINICAL NURSE SPECIALIST

## 2017-06-01 ENCOUNTER — ANTI-COAG VISIT (OUTPATIENT)
Dept: CARDIOLOGY | Age: 82
End: 2017-06-01
Payer: MEDICARE

## 2017-06-01 DIAGNOSIS — I48.20 CHRONIC ATRIAL FIBRILLATION (HCC): ICD-10-CM

## 2017-06-01 LAB
INTERNATIONAL NORMALIZATION RATIO, POC: 1.7
PROTHROMBIN TIME, POC: NORMAL

## 2017-06-01 PROCEDURE — 85610 PROTHROMBIN TIME: CPT | Performed by: NURSE PRACTITIONER

## 2017-06-01 PROCEDURE — 1036F TOBACCO NON-USER: CPT | Performed by: NURSE PRACTITIONER

## 2017-06-06 ENCOUNTER — OFFICE VISIT (OUTPATIENT)
Dept: FAMILY MEDICINE CLINIC | Facility: CLINIC | Age: 82
End: 2017-06-06

## 2017-06-06 VITALS
HEIGHT: 66 IN | TEMPERATURE: 98.7 F | BODY MASS INDEX: 26.84 KG/M2 | SYSTOLIC BLOOD PRESSURE: 116 MMHG | OXYGEN SATURATION: 95 % | WEIGHT: 167 LBS | HEART RATE: 96 BPM | DIASTOLIC BLOOD PRESSURE: 60 MMHG | RESPIRATION RATE: 20 BRPM

## 2017-06-06 DIAGNOSIS — Z95.0 PACEMAKER: ICD-10-CM

## 2017-06-06 DIAGNOSIS — K57.31 DIVERTICULOSIS OF LARGE INTESTINE WITH HEMORRHAGE: ICD-10-CM

## 2017-06-06 DIAGNOSIS — K44.9 HIATAL HERNIA: ICD-10-CM

## 2017-06-06 DIAGNOSIS — I10 ESSENTIAL HYPERTENSION: ICD-10-CM

## 2017-06-06 DIAGNOSIS — E03.8 OTHER SPECIFIED HYPOTHYROIDISM: ICD-10-CM

## 2017-06-06 DIAGNOSIS — D35.00 ADRENAL ADENOMA, UNSPECIFIED LATERALITY: ICD-10-CM

## 2017-06-06 DIAGNOSIS — I48.91 ATRIAL FIBRILLATION, UNSPECIFIED TYPE (HCC): ICD-10-CM

## 2017-06-06 DIAGNOSIS — Z09 HOSPITAL DISCHARGE FOLLOW-UP: Primary | ICD-10-CM

## 2017-06-06 DIAGNOSIS — K92.2 GASTROINTESTINAL HEMORRHAGE, UNSPECIFIED GASTROINTESTINAL HEMORRHAGE TYPE: ICD-10-CM

## 2017-06-06 PROCEDURE — 99214 OFFICE O/P EST MOD 30 MIN: CPT | Performed by: NURSE PRACTITIONER

## 2017-06-06 RX ORDER — LEVOTHYROXINE SODIUM 0.1 MG/1
100 TABLET ORAL DAILY
Qty: 90 TABLET | Refills: 1 | Status: SHIPPED | OUTPATIENT
Start: 2017-06-06 | End: 2018-01-01 | Stop reason: SDUPTHER

## 2017-06-06 RX ORDER — AMLODIPINE BESYLATE 10 MG/1
10 TABLET ORAL DAILY
Qty: 90 TABLET | Refills: 1 | Status: SHIPPED | OUTPATIENT
Start: 2017-06-06 | End: 2017-07-14 | Stop reason: SDUPTHER

## 2017-06-06 RX ORDER — METOPROLOL TARTRATE 50 MG/1
50 TABLET, FILM COATED ORAL 2 TIMES DAILY
Qty: 180 TABLET | Refills: 1 | Status: SHIPPED | OUTPATIENT
Start: 2017-06-06 | End: 2017-06-08

## 2017-06-06 NOTE — PROGRESS NOTES
Transitional Care Follow Up Visit  Subjective     Sam Farr is a 89 y.o. male who presents for a transitional care management visit. He also needs all his meds refilled.     Within 48 business hours after discharge our office contacted him via telephone to coordinate his care and needs.      I reviewed and discussed the details of that call along with the discharge summary, hospital problems, inpatient lab results, inpatient diagnostic studies, and consultation reports with Sam.    History of Present Illness   Course During Hospital Stay:  Pt states, I just started having bloody rectal bleeding which was bright red in nature that started 5/18/17. Wife took him to [facility] where he was admitted and treated from a GI bleed.  Pt is on warfarin for heart problems.  The blood thinner was held and he spent 24 hours in the hospital until the bleeding stopped.  A colonoscopy was set up for the future. He was diagnosed with [place] bleed of unclear cause. All labs, CT abdomen (hiatal hernia, colonic diverticulosis with possible minimal diverticulitis involving the proximal sigmoid colon as well as mild wall thickening and high density fluid in the distal descending colon and sigmoid colon.  The small bowel is non distended.  There was a fat containing left inguinal hernia.  Three was no free air or fluid.  There was no adenopathy.  There was atherosclerotic disease within the abdominal aorta and iliac arteries.  Significant prostatic enlargement, adrenal adenoma. He improved and was discharge home to continue medications as previously prescribed     The following portions of the patient's history were reviewed and updated as appropriate: allergies, current medications, past family history, past medical history, past social history, past surgical history and problem list.    Review of Systems   Constitutional: Positive for activity change and appetite change. Negative for fatigue and fever.   Respiratory: Negative for  cough, chest tightness and shortness of breath.    Cardiovascular: Negative for chest pain and palpitations.   Gastrointestinal: Positive for blood in stool (resolved ).   Neurological: Negative for dizziness, light-headedness and headaches.   Hematological: Negative for adenopathy. Bruises/bleeds easily.   All other systems reviewed and are negative.      Objective   Physical Exam   Constitutional: He is oriented to person, place, and time. Vital signs are normal. He appears well-developed and well-nourished. He is cooperative.   HENT:   Head: Normocephalic and atraumatic.   Right Ear: Tympanic membrane, external ear and ear canal normal. Decreased hearing is noted.   Left Ear: Tympanic membrane, external ear and ear canal normal. Decreased hearing is noted.   Nose: Nose normal.   Mouth/Throat: Uvula is midline, oropharynx is clear and moist and mucous membranes are normal.   Eyes: Conjunctivae, EOM and lids are normal. Pupils are equal, round, and reactive to light.   Neck: Normal range of motion. Neck supple.   Cardiovascular: Normal rate, regular rhythm, normal heart sounds, intact distal pulses and normal pulses.    Pulmonary/Chest: Effort normal and breath sounds normal.   Abdominal: Soft. Bowel sounds are normal.   Musculoskeletal: Normal range of motion.   Lymphadenopathy:        Head (right side): No submandibular adenopathy present.        Head (left side): No submandibular adenopathy present.     He has no cervical adenopathy.     He has no axillary adenopathy.   Neurological: He is alert and oriented to person, place, and time. He has normal strength. No cranial nerve deficit or sensory deficit. He displays a negative Romberg sign.   Skin: Skin is warm, dry and intact.   Psychiatric: He has a normal mood and affect. His speech is normal and behavior is normal. Judgment and thought content normal.   Nursing note and vitals reviewed.      Assessment/Plan   Sam was seen today for gi problem.    Diagnoses  and all orders for this visit:    Hospital discharge follow-up    Essential hypertension  -     amLODIPine (NORVASC) 10 MG tablet; Take 1 tablet by mouth Daily.    Other specified hypothyroidism  -     levothyroxine (SYNTHROID, LEVOTHROID) 100 MCG tablet; Take 1 tablet by mouth Daily.    Atrial fibrillation, unspecified type  -     metoprolol tartrate (LOPRESSOR) 50 MG tablet; Take 1 tablet by mouth 2 (Two) Times a Day for 90 days. 1/2 tab bid    Gastrointestinal hemorrhage, unspecified gastrointestinal hemorrhage type  -     CBC & Differential; Future  Return in about 1 month (around 7/6/2017).    Lisbet Whyte, DNP, APRN-BC  06/06/2017

## 2017-06-08 RX ORDER — METOPROLOL TARTRATE 50 MG/1
50 TABLET, FILM COATED ORAL
COMMUNITY
End: 2017-07-14 | Stop reason: SDUPTHER

## 2017-06-12 PROBLEM — N40.0 ENLARGED PROSTATE: Status: ACTIVE | Noted: 2017-06-12

## 2017-06-12 PROBLEM — K57.30 DIVERTICULA OF COLON: Status: ACTIVE | Noted: 2017-06-12

## 2017-06-12 PROBLEM — K40.90 INGUINAL HERNIA: Status: ACTIVE | Noted: 2017-06-12

## 2017-06-12 PROBLEM — K44.9 HIATAL HERNIA: Status: ACTIVE | Noted: 2017-06-12

## 2017-06-12 PROBLEM — D35.00 ADRENAL ADENOMA: Status: ACTIVE | Noted: 2017-06-12

## 2017-06-15 ENCOUNTER — ANTI-COAG VISIT (OUTPATIENT)
Dept: CARDIOLOGY | Age: 82
End: 2017-06-15
Payer: MEDICARE

## 2017-06-15 ENCOUNTER — RESULTS ENCOUNTER (OUTPATIENT)
Dept: FAMILY MEDICINE CLINIC | Facility: CLINIC | Age: 82
End: 2017-06-15

## 2017-06-15 DIAGNOSIS — K92.2 GASTROINTESTINAL HEMORRHAGE, UNSPECIFIED GASTROINTESTINAL HEMORRHAGE TYPE: ICD-10-CM

## 2017-06-15 DIAGNOSIS — I48.20 CHRONIC ATRIAL FIBRILLATION (HCC): ICD-10-CM

## 2017-06-15 LAB
INTERNATIONAL NORMALIZATION RATIO, POC: 2.2
PROTHROMBIN TIME, POC: NORMAL

## 2017-06-15 PROCEDURE — 85610 PROTHROMBIN TIME: CPT | Performed by: CLINICAL NURSE SPECIALIST

## 2017-06-15 PROCEDURE — 1036F TOBACCO NON-USER: CPT | Performed by: CLINICAL NURSE SPECIALIST

## 2017-07-07 DIAGNOSIS — C18.9 MALIGNANT NEOPLASM OF COLON, UNSPECIFIED SITE: Primary | ICD-10-CM

## 2017-07-10 ENCOUNTER — OFFICE VISIT (OUTPATIENT)
Dept: ONCOLOGY | Facility: CLINIC | Age: 82
End: 2017-07-10

## 2017-07-10 ENCOUNTER — LAB (OUTPATIENT)
Dept: ONCOLOGY | Facility: CLINIC | Age: 82
End: 2017-07-10

## 2017-07-10 VITALS
HEIGHT: 66 IN | WEIGHT: 165.1 LBS | SYSTOLIC BLOOD PRESSURE: 118 MMHG | BODY MASS INDEX: 26.53 KG/M2 | OXYGEN SATURATION: 98 % | HEART RATE: 80 BPM | TEMPERATURE: 97.9 F | RESPIRATION RATE: 16 BRPM | DIASTOLIC BLOOD PRESSURE: 82 MMHG

## 2017-07-10 DIAGNOSIS — Z12.5 PROSTATE CANCER SCREENING: ICD-10-CM

## 2017-07-10 DIAGNOSIS — C18.9 MALIGNANT NEOPLASM OF COLON, UNSPECIFIED PART OF COLON (HCC): Primary | ICD-10-CM

## 2017-07-10 DIAGNOSIS — N32.0 BLADDER NECK OBSTRUCTION: ICD-10-CM

## 2017-07-10 LAB
ALBUMIN SERPL-MCNC: 3.9 G/DL (ref 3.5–5)
ALBUMIN/GLOB SERPL: 1.1 G/DL
ALP SERPL-CCNC: 61 U/L (ref 38–126)
ALT SERPL W P-5'-P-CCNC: 22 U/L (ref 21–72)
ANION GAP SERPL CALCULATED.3IONS-SCNC: 8 MMOL/L
AST SERPL-CCNC: 23 U/L (ref 5–40)
AUTO MIXED CELLS #: 0.7 10*3/MM3 (ref 0.1–1.5)
AUTO MIXED CELLS %: 10.2 % (ref 0.2–15.1)
BILIRUB SERPL-MCNC: 0.6 MG/DL (ref 0.2–1.3)
BUN BLD-MCNC: 16 MG/DL (ref 9–21)
BUN/CREAT SERPL: 16 (ref 7–25)
CALCIUM SPEC-SCNC: 8.8 MG/DL (ref 8.4–10.2)
CHLORIDE SERPL-SCNC: 101 MMOL/L (ref 98–107)
CO2 SERPL-SCNC: 29 MMOL/L (ref 22–30)
CREAT BLD-MCNC: 1 MG/DL (ref 0.8–1.5)
ERYTHROCYTE [DISTWIDTH] IN BLOOD BY AUTOMATED COUNT: 15.2 % (ref 11.5–14.5)
GFR SERPL CREATININE-BSD FRML MDRD: 70 ML/MIN/1.73
GLOBULIN UR ELPH-MCNC: 3.6 GM/DL
GLUCOSE BLD-MCNC: 118 MG/DL (ref 75–110)
HCT VFR BLD AUTO: 42.8 % (ref 42–52)
HGB BLD-MCNC: 13.6 G/DL (ref 14–18)
LYMPHOCYTES # BLD AUTO: 2.2 10*3/MM3 (ref 0.8–7)
LYMPHOCYTES NFR BLD AUTO: 30.3 % (ref 10–58.5)
MCH RBC QN AUTO: 30.6 PG (ref 27–31)
MCHC RBC AUTO-ENTMCNC: 31.8 G/DL (ref 33–37)
MCV RBC AUTO: 96.5 FL (ref 80–94)
NEUTROPHILS # BLD AUTO: 4.3 10*3/MM3 (ref 2–7.8)
NEUTROPHILS NFR BLD AUTO: 59.5 % (ref 37–92)
PLATELET # BLD AUTO: 269 10*3/MM3 (ref 130–400)
PMV BLD AUTO: 7.9 FL (ref 6–12)
POTASSIUM BLD-SCNC: 4 MMOL/L (ref 3.6–5)
PROT SERPL-MCNC: 7.5 G/DL (ref 6.3–8.2)
RBC # BLD AUTO: 4.44 10*6/MM3 (ref 4.7–6.1)
SODIUM BLD-SCNC: 138 MMOL/L (ref 137–145)
WBC NRBC COR # BLD: 7.2 10*3/MM3 (ref 4.8–10.8)

## 2017-07-10 PROCEDURE — 80053 COMPREHEN METABOLIC PANEL: CPT | Performed by: INTERNAL MEDICINE

## 2017-07-10 PROCEDURE — 99214 OFFICE O/P EST MOD 30 MIN: CPT | Performed by: INTERNAL MEDICINE

## 2017-07-10 PROCEDURE — 36415 COLL VENOUS BLD VENIPUNCTURE: CPT | Performed by: INTERNAL MEDICINE

## 2017-07-10 PROCEDURE — 85025 COMPLETE CBC W/AUTO DIFF WBC: CPT | Performed by: INTERNAL MEDICINE

## 2017-07-10 RX ORDER — ALENDRONATE SODIUM 70 MG/1
TABLET ORAL
COMMUNITY
End: 2017-07-14 | Stop reason: SDUPTHER

## 2017-07-10 NOTE — PROGRESS NOTES
[facility]  HEMATOLOGY & ONCOLOGY        Subjective     VISIT DIAGNOSIS:   No diagnosis found.    REASON FOR VISIT:     No chief complaint on file.       HEMATOLOGY / ONCOLOGY HISTORY: Mr. Farr is a 89 year old male who was seen for iron deficiency anemia. His hemoglobin had improved although colonoscopy found  colon cancer. He had a colectomy finding colon cancer with 5/11 lymph nodes positive for malignancy. He is stage III. He will get benefit  from adjuvant chemotherapy but considering his old age, he has decided not to get any chemo, and decision was made for surveillance  only.   No history exists.       Cancer Staging Information:  No matching staging information was found for the patient.      INTERVAL HISTORY  Patient ID: Sam Farr is a 89 y.o. year old male         Review of Systems   Constitutional: Negative.    HENT: Negative.    Eyes: Negative.    Respiratory: Negative.    Cardiovascular: Negative.    Gastrointestinal: Negative.    Endocrine: Negative.    Genitourinary: Negative.    Musculoskeletal: Negative.    Skin: Negative.    Allergic/Immunologic: Negative.    Neurological: Negative.    Hematological: Negative.    Psychiatric/Behavioral: Negative.             Medications:    Current Outpatient Prescriptions   Medication Sig Dispense Refill   • alendronate (FOSAMAX) 70 MG tablet TAKE 1 TABLET EVERY WEEK 12 tablet 1   • alendronate (FOSAMAX) 70 MG tablet Take  by mouth.     • amLODIPine (NORVASC) 10 MG tablet Take 1 tablet by mouth Daily. 90 tablet 1   • Calcium-Vitamin D (CALTRATE 600 PLUS-VIT D PO) Take  by mouth daily.     • dutasteride (AVODART) 0.5 MG capsule Daily.     • ferrous sulfate 325 (65 FE) MG tablet Take 325 mg by mouth daily with breakfast.     • finasteride (PROSCAR) 5 MG tablet Take 1 tablet by mouth Daily. 90 tablet 3   • levothyroxine (SYNTHROID, LEVOTHROID) 100 MCG tablet Take 1 tablet by mouth Daily. 90 tablet 1   • metoprolol tartrate (LOPRESSOR)  50 MG tablet Take 50 mg by mouth. Take 1/2 tab twice daily.     • tamsulosin (FLOMAX) 0.4 MG capsule 24 hr capsule Take 1 capsule by mouth daily.     • warfarin (COUMADIN) 5 MG tablet Take 5 mg by mouth daily. Dr. Small manages coumadin.       No current facility-administered medications for this visit.        ALLERGIES:  No Known Allergies    Objective      @VITALS    Current Status 7/10/2017   ECOG score 1       General Appearance: Patient is awake, alert, oriented and in no acute distress. Patient is welldeveloped, wellnourished, and appears stated age.  HEENT: Normocephalic. Sclerae clear, conjunctiva pink, extraocular movements intact, pupils, round, reactive to light and  accommodation. Mouth and throat are clear with moist oral mucosa.  NECK: Supple, no jugular venous distention, thyroid not enlarged.  LYMPH: No cervical, supraclavicular, axillary, or inguinal lymphadenopathy.  CHEST: Equal bilateral expansion, AP  diameter normal, resonant percussion note  LUNGS: Good air movement, no rales, rhonchi, rubs or wheezes with auscultation  CARDIO: Regular sinus rhythm, no murmurs, gallops or rubs.  ABDOMEN: Nondistended, soft, No tenderness, no guarding, no rebound, No hepatosplenomegaly. No abdominal masses. Bowel sounds positive. No hernia  GENITALIA: Not examined.  BREASTS: Not examined.  MUSKEL: No joint swelling, decreased motion, or inflammation  EXTREMS: No edema, clubbing, cyanosis, No varicose veins.  NEURO: Grossly nonfocal, Gait is coordinated and smooth, Cognition is preserved.  SKIN: No rashes, no ecchymoses, no petechia.  PSYCH: Oriented to time, place and person. Memory is preserved. Mood and affect appear normal      RECENT LABS:  Orders Only on 07/07/2017   Component Date Value Ref Range Status   • WBC 07/10/2017 7.20  4.80 - 10.80 10*3/mm3 Final   • RBC 07/10/2017 4.44* 4.70 - 6.10 10*6/mm3 Final   • Hemoglobin 07/10/2017 13.6* 14.0 - 18.0 g/dL Final   • Hematocrit 07/10/2017 42.8  42.0 - 52.0 %  Final   • MCV 07/10/2017 96.5* 80.0 - 94.0 fL Final   • MCH 07/10/2017 30.6  27.0 - 31.0 pg Final   • MCHC 07/10/2017 31.8* 33.0 - 37.0 g/dL Final   • RDW 07/10/2017 15.2* 11.5 - 14.5 % Final   • MPV 07/10/2017 7.9  6.0 - 12.0 fL Final   • Platelets 07/10/2017 269  130 - 400 10*3/mm3 Final   • Neutrophil % 07/10/2017 59.5  37.0 - 92.0 % Final   • Lymphocyte % 07/10/2017 30.3  10.0 - 58.5 % Final   • Auto Mixed Cells % 07/10/2017 10.2  0.2 - 15.1 % Final   • Neutrophils, Absolute 07/10/2017 4.30  2.00 - 7.80 10*3/mm3 Final   • Lymphocytes, Absolute 07/10/2017 2.20  0.80 - 7.00 10*3/mm3 Final   • Auto Mixed Cells # 07/10/2017 0.70  0.10 - 1.50 10*3/mm3 Final       RADIOLOGY:  No results found.         Assessment/Plan      Colon Ca, 5/11+ LN, S/P resection. Because of age, he did not consider any chemo.  Currently on OBSERVATION. Recent CT scan 11/2016: Indeterminate 7mm lesion in L pelvic area.  Indeterminate L4 lesion And enlarged Prostate with NL PSA.  CEA drawn 11/23 is pending.    Continue OBSERVATION. RTC 4 months with repeat CEA and LFTs.                     Negro Clements MD    7/10/2017    2:56 PM             [facility]  HEMATOLOGY & ONCOLOGY        Subjective     VISIT DIAGNOSIS:   No diagnosis found.    REASON FOR VISIT:     No chief complaint on file.       HEMATOLOGY / ONCOLOGY HISTORY: Mr. Farr is a 89 year old male who was seen for iron deficiency anemia. His hemoglobin had improved although colonoscopy found  colon cancer. He had a colectomy finding colon cancer with 5/11 lymph nodes positive for malignancy. He is stage III. He will get benefit  from adjuvant chemotherapy but considering his old age, he has decided not to get any chemo, and decision was made for surveillance  only.   No history exists.       Cancer Staging Information:  No matching staging information was found for the patient.      INTERVAL HISTORY  Patient ID: Sam Farr is a 89 y.o. year old male          Review of Systems   Constitutional: Negative.    HENT: Negative.    Eyes: Negative.    Respiratory: Negative.    Cardiovascular: Negative.    Gastrointestinal: Negative.    Endocrine: Negative.    Genitourinary: Negative.    Musculoskeletal: Negative.    Skin: Negative.    Allergic/Immunologic: Negative.    Neurological: Negative.    Hematological: Negative.    Psychiatric/Behavioral: Negative.             Medications:    Current Outpatient Prescriptions   Medication Sig Dispense Refill   • alendronate (FOSAMAX) 70 MG tablet TAKE 1 TABLET EVERY WEEK 12 tablet 1   • alendronate (FOSAMAX) 70 MG tablet Take  by mouth.     • amLODIPine (NORVASC) 10 MG tablet Take 1 tablet by mouth Daily. 90 tablet 1   • Calcium-Vitamin D (CALTRATE 600 PLUS-VIT D PO) Take  by mouth daily.     • dutasteride (AVODART) 0.5 MG capsule Daily.     • ferrous sulfate 325 (65 FE) MG tablet Take 325 mg by mouth daily with breakfast.     • finasteride (PROSCAR) 5 MG tablet Take 1 tablet by mouth Daily. 90 tablet 3   • levothyroxine (SYNTHROID, LEVOTHROID) 100 MCG tablet Take 1 tablet by mouth Daily. 90 tablet 1   • metoprolol tartrate (LOPRESSOR) 50 MG tablet Take 50 mg by mouth. Take 1/2 tab twice daily.     • tamsulosin (FLOMAX) 0.4 MG capsule 24 hr capsule Take 1 capsule by mouth daily.     • warfarin (COUMADIN) 5 MG tablet Take 5 mg by mouth daily. Dr. Small manages coumadin.       No current facility-administered medications for this visit.        ALLERGIES:  No Known Allergies    Objective      @VITALS    Current Status 7/10/2017   ECOG score 1       General Appearance: Patient is awake, alert, oriented and in no acute distress. Patient is welldeveloped, wellnourished, and appears stated age.  HEENT: Normocephalic. Sclerae clear, conjunctiva pink, extraocular movements intact, pupils, round, reactive to light and  accommodation. Mouth and throat are clear with moist oral mucosa.  NECK: Supple, no jugular venous distention, thyroid not  enlarged.  LYMPH: No cervical, supraclavicular, axillary, or inguinal lymphadenopathy.  CHEST: Equal bilateral expansion, AP  diameter normal, resonant percussion note  LUNGS: Good air movement, no rales, rhonchi, rubs or wheezes with auscultation  CARDIO: Regular sinus rhythm, no murmurs, gallops or rubs.  ABDOMEN: Nondistended, soft, No tenderness, no guarding, no rebound, No hepatosplenomegaly. No abdominal masses. Bowel sounds positive. No hernia  GENITALIA: Not examined.  BREASTS: Not examined.  MUSKEL: No joint swelling, decreased motion, or inflammation  EXTREMS: No edema, clubbing, cyanosis, No varicose veins.  NEURO: Grossly nonfocal, Gait is coordinated and smooth, Cognition is preserved.  SKIN: No rashes, no ecchymoses, no petechia.  PSYCH: Oriented to time, place and person. Memory is preserved. Mood and affect appear normal      RECENT LABS:  Orders Only on 07/07/2017   Component Date Value Ref Range Status   • WBC 07/10/2017 7.20  4.80 - 10.80 10*3/mm3 Final   • RBC 07/10/2017 4.44* 4.70 - 6.10 10*6/mm3 Final   • Hemoglobin 07/10/2017 13.6* 14.0 - 18.0 g/dL Final   • Hematocrit 07/10/2017 42.8  42.0 - 52.0 % Final   • MCV 07/10/2017 96.5* 80.0 - 94.0 fL Final   • MCH 07/10/2017 30.6  27.0 - 31.0 pg Final   • MCHC 07/10/2017 31.8* 33.0 - 37.0 g/dL Final   • RDW 07/10/2017 15.2* 11.5 - 14.5 % Final   • MPV 07/10/2017 7.9  6.0 - 12.0 fL Final   • Platelets 07/10/2017 269  130 - 400 10*3/mm3 Final   • Neutrophil % 07/10/2017 59.5  37.0 - 92.0 % Final   • Lymphocyte % 07/10/2017 30.3  10.0 - 58.5 % Final   • Auto Mixed Cells % 07/10/2017 10.2  0.2 - 15.1 % Final   • Neutrophils, Absolute 07/10/2017 4.30  2.00 - 7.80 10*3/mm3 Final   • Lymphocytes, Absolute 07/10/2017 2.20  0.80 - 7.00 10*3/mm3 Final   • Auto Mixed Cells # 07/10/2017 0.70  0.10 - 1.50 10*3/mm3 Final       RADIOLOGY:  No results found.         Assessment/Plan      Colon Ca, 5/11+ LN, S/P resection. Because of age, he did not consider any  chemo.  Currently on OBSERVATION. Recent CT scan 11/2016: Indeterminate 7mm lesion in L pelvic area.  Indeterminate L4 lesion And enlarged Prostate with NL PSA.  CEA drawn 11/23 is normal.    Continue OBSERVATION. RTC 4 months with repeat CEA and LFTs.    He is on Coumadin for atrial fibrillation and more recently has some GI bleed.  Hemoglobin is stable today at 13.6, repeat liver function tests are normal, CEA is pending.  His weight is stable at 165 old is lost 3 pounds from back in March 2017.  I have encouraged him to eat.  On my examination liver is not palpable and Belly appears soft.  He still pretty active and more disease your, also is able to work in his garage and periodically drives his car.  Therefore his performance status is ECOG 0.  Even though is 89 he is pretty active.  I will therefore pursue this cancer aggressively.  If the CEA is high I am would order a CT scan.  If the CEA is normal I would do a yearly CT scan which would be due now in November 2017.                     Negro Clements MD    7/10/2017    2:56 PM

## 2017-07-11 LAB
CEA SERPL-MCNC: 0.45 NG/ML (ref 0–5)
PSA SERPL-MCNC: 0.86 NG/ML (ref 0–4)

## 2017-07-13 ENCOUNTER — ANTI-COAG VISIT (OUTPATIENT)
Dept: CARDIOLOGY | Age: 82
End: 2017-07-13
Payer: MEDICARE

## 2017-07-13 DIAGNOSIS — I48.20 CHRONIC ATRIAL FIBRILLATION (HCC): ICD-10-CM

## 2017-07-13 LAB
INTERNATIONAL NORMALIZATION RATIO, POC: 1.9
PROTHROMBIN TIME, POC: NORMAL

## 2017-07-13 PROCEDURE — 1036F TOBACCO NON-USER: CPT | Performed by: CLINICAL NURSE SPECIALIST

## 2017-07-13 PROCEDURE — 85610 PROTHROMBIN TIME: CPT | Performed by: CLINICAL NURSE SPECIALIST

## 2017-07-14 ENCOUNTER — OFFICE VISIT (OUTPATIENT)
Dept: FAMILY MEDICINE CLINIC | Facility: CLINIC | Age: 82
End: 2017-07-14

## 2017-07-14 VITALS
BODY MASS INDEX: 26.23 KG/M2 | RESPIRATION RATE: 16 BRPM | TEMPERATURE: 97.8 F | HEIGHT: 66 IN | HEART RATE: 58 BPM | DIASTOLIC BLOOD PRESSURE: 60 MMHG | WEIGHT: 163.2 LBS | OXYGEN SATURATION: 95 % | SYSTOLIC BLOOD PRESSURE: 102 MMHG

## 2017-07-14 DIAGNOSIS — M81.0 OSTEOPOROSIS: Primary | ICD-10-CM

## 2017-07-14 DIAGNOSIS — I10 ESSENTIAL HYPERTENSION: ICD-10-CM

## 2017-07-14 DIAGNOSIS — I48.20 CHRONIC ATRIAL FIBRILLATION (HCC): ICD-10-CM

## 2017-07-14 PROCEDURE — 99214 OFFICE O/P EST MOD 30 MIN: CPT | Performed by: NURSE PRACTITIONER

## 2017-07-14 RX ORDER — ALENDRONATE SODIUM 70 MG/1
70 TABLET ORAL
Qty: 90 TABLET | Refills: 1 | Status: SHIPPED | OUTPATIENT
Start: 2017-07-14 | End: 2018-01-01 | Stop reason: SDUPTHER

## 2017-07-14 RX ORDER — AMLODIPINE BESYLATE 10 MG/1
10 TABLET ORAL DAILY
Qty: 90 TABLET | Refills: 1 | Status: SHIPPED | OUTPATIENT
Start: 2017-07-14 | End: 2017-01-01 | Stop reason: SDUPTHER

## 2017-07-14 RX ORDER — WARFARIN SODIUM 5 MG/1
5 TABLET ORAL
Qty: 90 TABLET | Refills: 1 | Status: SHIPPED | OUTPATIENT
Start: 2017-07-14 | End: 2018-01-01 | Stop reason: SDUPTHER

## 2017-07-14 RX ORDER — METOPROLOL TARTRATE 50 MG/1
TABLET, FILM COATED ORAL
Qty: 90 TABLET | Refills: 1 | Status: SHIPPED | OUTPATIENT
Start: 2017-07-14 | End: 2018-01-01 | Stop reason: SDUPTHER

## 2017-07-14 NOTE — PROGRESS NOTES
Chief Complaint   Patient presents with   • Hypertension     1 month follow up   • Atrial Fibrillation   • complaining of some dizziness        No Known Allergies    HPI: Sam Farr is a 89 y.o. male presents today with HTN, afib, dizziness.  He says his wife does all the cooking or we go out to eat. She takes care of the financial stuff. Has multiple chronic problems: Osteoporosis stable with fosamax, HTN stable with amlodipine, iron deficiency stable with ferrous sulfate, hypothyroidism stable with levothyroxine, BPH stable with flomax, and Afib stable with coumadin and metoprolol.     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   • Hiatal hernia 6/12/2017   • History of anticoagulant therapy    • Hx of colon cancer, stage III    • Hypothyroidism    • Iron deficiency anemia    • Osteoporosis      Past Surgical History:   Procedure Laterality Date   • COLON SURGERY      colon cancer stage 3   • PACEMAKER IMPLANTATION  2006   • PACEMAKER REPLACEMENT  2012   • TYMPANOPLASTY      replaced     Social History     Social History   • Marital status:      Spouse name: N/A   • Number of children: N/A   • Years of education: N/A     Social History Main Topics   • Smoking status: Never Smoker   • Smokeless tobacco: Never Used   • Alcohol use Yes      Comment: wine occassionally   • Drug use: No   • Sexual activity: Defer     Other Topics Concern   • None     Social History Narrative     Family History   Problem Relation Age of Onset   • Colon cancer Mother    • Heart attack Father    • COPD Sister    • No Known Problems Daughter    • Cancer Son    • Appendicitis Maternal Grandmother    • Heart attack Maternal Grandfather    • No Known Problems Paternal Grandmother    • No Known Problems  "Paternal Grandfather    • No Known Problems Sister    • No Known Problems Sister    • No Known Problems Sister    • Heart attack Son    • No Known Problems Son    • No Known Problems Son    • No Known Problems Daughter    • No Known Problems Daughter    • No Known Problems Daughter        Current Outpatient Prescriptions on File Prior to Visit   Medication Sig Dispense Refill   • alendronate (FOSAMAX) 70 MG tablet TAKE 1 TABLET EVERY WEEK 12 tablet 1   • alendronate (FOSAMAX) 70 MG tablet Take  by mouth.     • amLODIPine (NORVASC) 10 MG tablet Take 1 tablet by mouth Daily. 90 tablet 1   • Calcium-Vitamin D (CALTRATE 600 PLUS-VIT D PO) Take  by mouth daily.     • dutasteride (AVODART) 0.5 MG capsule Daily.     • ferrous sulfate 325 (65 FE) MG tablet Take 325 mg by mouth daily with breakfast.     • finasteride (PROSCAR) 5 MG tablet Take 1 tablet by mouth Daily. 90 tablet 3   • levothyroxine (SYNTHROID, LEVOTHROID) 100 MCG tablet Take 1 tablet by mouth Daily. 90 tablet 1   • metoprolol tartrate (LOPRESSOR) 50 MG tablet Take 50 mg by mouth. Take 1/2 tab twice daily.     • tamsulosin (FLOMAX) 0.4 MG capsule 24 hr capsule Take 1 capsule by mouth daily.     • warfarin (COUMADIN) 5 MG tablet Take 5 mg by mouth daily. Dr. Small manages coumadin.       No current facility-administered medications on file prior to visit.         REVIEW OF SYMPTOMS: (Positives bolded)  General:  weight loss, fever, chills, night sweats, fatigue, appetite loss  HEENT:  blurry vision, eye pain, eye discharge, dry eyes, decreased vision, sore throat tinnitus, bloody nose, hearin gloss, sinus pain/pressure, ear pain/pressure.   Respiratory: shortness of breath, cough, hemoptysis, wheezing, pleurisy,   Cardiovascular:  chest pain, PND, palpitation, edema, orthopnea, syncope, swelling of extremities  Neuro:  Migraine, numbness, ataxia, tremor, vertigo, weakness, memory loss,  \"All other systems reviewed and negative, except as listed " above.”    OBJECTIVE:  Constitutional:  Appearance-No acute distress, Consistent with stated age. Orientation- Oriented x 3, alert Posture-Not doubled over. Gait-Normal pace, normal arm movement. Posture- Normal Build and Nutrition-Well developed and well nourished.  General- Patient is pleasant and cooperative with the interview and exam.    Integumentary: General-No rashes, ulcers or lesions. No edema.  Palpation- Normal skin moisture/turgor. Skin is warm to touch, no increased warmth. Capillary refill is normal bilateral Upper and lower extremity.     Head/Neck: Head- normocephalic and atraumatic.  Neck- without visible/palpable lumps or pulsations.  Palpation- No bony tenderness about head/neck along frontal, occiptial, temporal, parietal, mastoid, jawline, zygoma, orbit or any other location.  NO temporal artery tenderness. No TMJ tenderness. Normal cervical ROM.   Neck Supple.  Thyroid-No thyromegaly, no nodules    Eye: Bilaterally PERRLA, EOMI.  No discharge.  Upper and lower eyelids are normal. Sclera/conjunctiva normal without discharge. Cornea is normal and clear. Lens is normal.  Eyeball appears normal. No ciliary flushing, no conjunctival injection.    ENMT:  Pinna- normal without tenderness or erythema.  External auditory canal Left- normal without erythema or discharge, no excessive cerumen. External auditory canal Right-normal without erythema or discharge, no excessive cerumen. TM left- Grey/pearly, normal light reflex and anatomy TM Right- Grey/pearly, normal light reflex and anatomy Hearing Assessment-normal to conversational speech at 2-5 feet.  Nose and sinus-No sinus tenderness along frontal/maxillary region. External appearance normal and midline. Nares- bilateral quiet airflow, no discharge. Nasal mucosa- No bleeding noted and no ulcerations observed. Pink, moist. Turbinates non boggy. Lips- normal color, moist without cracks/lesions Oral Cavity/Palate- hard/soft palate intact without lesions,  oral mucosa pink and moist. Dentition assessed and discussed appropriate oral care. Tongue normal midline.  Oropharynx- no pharyngeal erythema, Uvula midline. No post nasal drip. No exudate. Salivary glands- Non tender to palpation    CHEST/LUNG: Inspection- symmetric chest wall no pectus deformity. Normal effort, no distress, no use of accessory muscles. Palpation- nontender sternum, ribline.  No abnormal pulsations. Auscultation- Breath sounds normal throughout all lung fields.  Normal tracheal sounds, Normal bronchial sounds overlying sternum, Bronchovessicular sounds normal between scapulae posteriorly, Normal vessicular breath sounds heard throughout periphery. Lungs are clear today. Adventitious sounds- No wheezes, rales, rhonchi.     CARDIOVASCULAR:  Carotid artery- normal, no bruits or abnormal pulsations. Jugular vein- no pulsations. Palpation/Percussion- Normal PMI, no palpable thrill  Auscultation- Regular rate and rhythm. No murmur noted in sitting, supine positions. Extremities- no digital clubbing, cyanosis, edema, increased warmth.    Peripheral Vascular: Upper extremity Left- Normal temperature with pink nailbeds and no ulcerations.  Upper extremity Right- Normal temperature with pink nailbeds and no ulcerations.  Lower extremity- Normal temperature with pink nailbeds and no ulcerations. DP pulses 2+ bilaterally.  Pedal hair intact.  Normal capillary refill. Edema- No edema.    Neurological: General- Moves all 4 extremities symmetrically. Symmetrical face and body posture. Cranial nerves- individually evaluated II-XII and intact. PERRLA, Normal EOMI, visual/special senses appear intact, Face is symmetrical and normal sensation/movement, normal tongue, normal strength/posture of neck musculature. Balance- Romberg intact.  Reflexes- ntact with DTR 2+ patellar, Achilles, bicep, brachial,tricep. Ankle clonus normal with 2 beats.  Strength- 5/5 bilateral UE and LE. Soft touch- intact bilateral UE and LE.   Temperature sensation- intact bilateral UE and LE. Cerebellar testing-Rapid alternating movements intact.  Heel shin intact. Able to walk normal gait, normal heel toe walking. Neck- supple.      Neuropsych: Oriented- Person, place, time. (AAOx3), Mood/affect- normal and congruent. Able to articulate well. Speech-Normal speech, normal rate, normal tone, normal use of language, volume and coherence.  Thought content- normal with ability to perform basic computations and apply abstract thought/reason. Associations- intact, no SI/HI, no hallucinations, delusions, obsessions.  Judgment/insight- Appropriate. Memory-Recall intact, remote and recent memory intact. Knowledge- Age appropriate fund of knowledge, concentration and attention span normal.    Lymphatic: Head/Neck- normal size and non tender to palpation. Axillary- Head and neck LN are normal size and non tender to palpation. Femoral and Inguinal- normal size and non tender to palpation.    Assessment/Plan:  Sam was seen today for hypertension, atrial fibrillation and complaining of some dizziness.    Diagnoses and all orders for this visit:    Osteoporosis  -     alendronate (FOSAMAX) 70 MG tablet; Take 1 tablet by mouth Every 7 (Seven) Days.    Essential hypertension  -     amLODIPine (NORVASC) 10 MG tablet; Take 1 tablet by mouth Daily.  -     metoprolol tartrate (LOPRESSOR) 50 MG tablet; Take 1/2 tab twice daily.    Chronic atrial fibrillation  -     warfarin (COUMADIN) 5 MG tablet; Take 1 tablet by mouth Daily. Dr. Small manages coumadin.    CBC, CMP all labs scanned and reviewed today    Return in about 6 months (around 1/14/2018), or if symptoms worsen or fail to improve.  Lisbet Whyte, DNP, APRN-BC  07/14/2017

## 2017-07-14 NOTE — PATIENT INSTRUCTIONS

## 2017-08-11 ENCOUNTER — ANTI-COAG VISIT (OUTPATIENT)
Dept: CARDIOLOGY | Age: 82
End: 2017-08-11
Payer: MEDICARE

## 2017-08-11 DIAGNOSIS — I48.20 CHRONIC ATRIAL FIBRILLATION (HCC): ICD-10-CM

## 2017-08-11 LAB
INTERNATIONAL NORMALIZATION RATIO, POC: 3.6
PROTHROMBIN TIME, POC: NORMAL

## 2017-08-11 PROCEDURE — 85610 PROTHROMBIN TIME: CPT | Performed by: CLINICAL NURSE SPECIALIST

## 2017-08-11 PROCEDURE — 1036F TOBACCO NON-USER: CPT | Performed by: CLINICAL NURSE SPECIALIST

## 2017-08-29 ENCOUNTER — OFFICE VISIT (OUTPATIENT)
Dept: CARDIOLOGY | Age: 82
End: 2017-08-29
Payer: MEDICARE

## 2017-08-29 VITALS
WEIGHT: 165 LBS | HEART RATE: 78 BPM | SYSTOLIC BLOOD PRESSURE: 128 MMHG | BODY MASS INDEX: 26.52 KG/M2 | DIASTOLIC BLOOD PRESSURE: 68 MMHG | HEIGHT: 66 IN

## 2017-08-29 DIAGNOSIS — I25.10 CORONARY ARTERY DISEASE INVOLVING NATIVE CORONARY ARTERY OF NATIVE HEART WITHOUT ANGINA PECTORIS: ICD-10-CM

## 2017-08-29 DIAGNOSIS — I48.20 CHRONIC ATRIAL FIBRILLATION (HCC): ICD-10-CM

## 2017-08-29 DIAGNOSIS — I48.20 ATRIAL FIBRILLATION, CHRONIC (HCC): ICD-10-CM

## 2017-08-29 DIAGNOSIS — I10 ESSENTIAL HYPERTENSION: ICD-10-CM

## 2017-08-29 DIAGNOSIS — Z95.0 PACEMAKER: ICD-10-CM

## 2017-08-29 DIAGNOSIS — I49.5 SICK SINUS SYNDROME (HCC): Primary | ICD-10-CM

## 2017-08-29 PROCEDURE — 1123F ACP DISCUSS/DSCN MKR DOCD: CPT | Performed by: INTERNAL MEDICINE

## 2017-08-29 PROCEDURE — G8598 ASA/ANTIPLAT THER USED: HCPCS | Performed by: INTERNAL MEDICINE

## 2017-08-29 PROCEDURE — 99213 OFFICE O/P EST LOW 20 MIN: CPT | Performed by: INTERNAL MEDICINE

## 2017-08-29 PROCEDURE — 93279 PRGRMG DEV EVAL PM/LDLS PM: CPT | Performed by: INTERNAL MEDICINE

## 2017-08-29 PROCEDURE — G8427 DOCREV CUR MEDS BY ELIG CLIN: HCPCS | Performed by: INTERNAL MEDICINE

## 2017-08-29 PROCEDURE — 1036F TOBACCO NON-USER: CPT | Performed by: INTERNAL MEDICINE

## 2017-08-29 PROCEDURE — G8419 CALC BMI OUT NRM PARAM NOF/U: HCPCS | Performed by: INTERNAL MEDICINE

## 2017-08-29 PROCEDURE — 4040F PNEUMOC VAC/ADMIN/RCVD: CPT | Performed by: INTERNAL MEDICINE

## 2017-08-30 ENCOUNTER — OFFICE VISIT (OUTPATIENT)
Dept: FAMILY MEDICINE CLINIC | Facility: CLINIC | Age: 82
End: 2017-08-30

## 2017-08-30 VITALS
RESPIRATION RATE: 20 BRPM | BODY MASS INDEX: 26.45 KG/M2 | TEMPERATURE: 98.1 F | SYSTOLIC BLOOD PRESSURE: 122 MMHG | OXYGEN SATURATION: 96 % | HEIGHT: 66 IN | HEART RATE: 120 BPM | DIASTOLIC BLOOD PRESSURE: 62 MMHG | WEIGHT: 164.6 LBS

## 2017-08-30 DIAGNOSIS — N30.01 ACUTE CYSTITIS WITH HEMATURIA: ICD-10-CM

## 2017-08-30 DIAGNOSIS — R31.9 HEMATURIA: Primary | ICD-10-CM

## 2017-08-30 LAB
BILIRUB BLD-MCNC: NEGATIVE MG/DL
CLARITY, POC: ABNORMAL
COLOR UR: ABNORMAL
GLUCOSE UR STRIP-MCNC: NEGATIVE MG/DL
KETONES UR QL: NEGATIVE
LEUKOCYTE EST, POC: ABNORMAL
NITRITE UR-MCNC: NEGATIVE MG/ML
PH UR: 6 [PH] (ref 5–8)
PROT UR STRIP-MCNC: ABNORMAL MG/DL
RBC # UR STRIP: ABNORMAL /UL
SP GR UR: 1.01 (ref 1–1.03)
UROBILINOGEN UR QL: NORMAL

## 2017-08-30 PROCEDURE — 99213 OFFICE O/P EST LOW 20 MIN: CPT | Performed by: NURSE PRACTITIONER

## 2017-08-30 PROCEDURE — 81003 URINALYSIS AUTO W/O SCOPE: CPT | Performed by: NURSE PRACTITIONER

## 2017-08-30 RX ORDER — FLAVOXATE HYDROCHLORIDE 100 MG/1
100 TABLET ORAL 3 TIMES DAILY PRN
Qty: 9 TABLET | Refills: 0 | Status: SHIPPED | OUTPATIENT
Start: 2017-08-30 | End: 2018-01-01

## 2017-08-30 RX ORDER — NITROFURANTOIN MACROCRYSTALS 100 MG/1
100 CAPSULE ORAL 4 TIMES DAILY
Qty: 14 CAPSULE | Refills: 0 | Status: SHIPPED | OUTPATIENT
Start: 2017-08-30 | End: 2017-09-06

## 2017-08-30 NOTE — PATIENT INSTRUCTIONS
Hematuria, Adult  Hematuria is blood in your urine. It can be caused by a bladder infection, kidney infection, prostate infection, kidney stone, or cancer of your urinary tract. Infections can usually be treated with medicine, and a kidney stone usually will pass through your urine. If neither of these is the cause of your hematuria, further workup to find out the reason may be needed.  It is very important that you tell your health care provider about any blood you see in your urine, even if the blood stops without treatment or happens without causing pain. Blood in your urine that happens and then stops and then happens again can be a symptom of a very serious condition. Also, pain is not a symptom in the initial stages of many urinary cancers.  HOME CARE INSTRUCTIONS   · Drink lots of fluid, 3-4 quarts a day. If you have been diagnosed with an infection, cranberry juice is especially recommended, in addition to large amounts of water.  · Avoid caffeine, tea, and carbonated beverages because they tend to irritate the bladder.  · Avoid alcohol because it may irritate the prostate.  · Take all medicines as directed by your health care provider.  · If you were prescribed an antibiotic medicine, finish it all even if you start to feel better.  · If you have been diagnosed with a kidney stone, follow your health care provider's instructions regarding straining your urine to catch the stone.  · Empty your bladder often. Avoid holding urine for long periods of time.  · After a bowel movement, women should cleanse front to back. Use each tissue only once.  · Empty your bladder before and after sexual intercourse if you are a female.  SEEK MEDICAL CARE IF:  · You develop back pain.  · You have a fever.  · You have a feeling of sickness in your stomach (nausea) or vomiting.  · Your symptoms are not better in 3 days. Return sooner if you are getting worse.  SEEK IMMEDIATE MEDICAL CARE IF:   · You develop severe vomiting and  are unable to keep the medicine down.  · You develop severe back or abdominal pain despite taking your medicines.  · You begin passing a large amount of blood or clots in your urine.  · You feel extremely weak or faint, or you pass out.  MAKE SURE YOU:   · Understand these instructions.  · Will watch your condition.  · Will get help right away if you are not doing well or get worse.     This information is not intended to replace advice given to you by your health care provider. Make sure you discuss any questions you have with your health care provider.     Document Released: 12/18/2006 Document Revised: 01/08/2016 Document Reviewed: 08/18/2014  [employer] Interactive Patient Education ©2017 [employer]

## 2017-08-30 NOTE — PROGRESS NOTES
Chief Complaint   Patient presents with   • Urinary Tract Infection     started last night        No Known Allergies    HPI::  Sam Farr is a 89 y.o. male presents who today with complaints of (dysuria,  frequency, urgency and burning).  Symptoms began 5 days ago. The symptoms are constant.  Says the urge is horrible and sometimes has some incontinence before he can get to restroom.   Rates pain 3/10.  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     Social History Main Topics   • Smoking status: Never Smoker   • Smokeless tobacco: Never Used   • Alcohol use Yes      Comment: wine occassionally   • Drug use: No   • Sexual activity: Defer     Other Topics Concern   • Not on file     Social History Narrative       Family History   Problem Relation Age of Onset   • Colon cancer Mother    • Heart attack Father    • COPD Sister    • No Known Problems Daughter    • Cancer Son    • Appendicitis  Maternal Grandmother    • Heart attack Maternal Grandfather    • No Known Problems Paternal Grandmother    • No Known Problems Paternal Grandfather    • No Known Problems Sister    • No Known Problems Sister    • No Known Problems Sister    • Heart attack Son    • No Known Problems Son    • No Known Problems Son    • No Known Problems Daughter    • No Known Problems Daughter    • No Known Problems Daughter        Current Outpatient Prescriptions on File Prior to Visit   Medication Sig Dispense Refill   • alendronate (FOSAMAX) 70 MG tablet Take 1 tablet by mouth Every 7 (Seven) Days. 90 tablet 1   • amLODIPine (NORVASC) 10 MG tablet Take 1 tablet by mouth Daily. 90 tablet 1   • Calcium-Vitamin D (CALTRATE 600 PLUS-VIT D PO) Take  by mouth daily.     • dutasteride (AVODART) 0.5 MG capsule Daily.     • ferrous sulfate 325 (65 FE) MG tablet Take 325 mg by mouth daily with breakfast.     • finasteride (PROSCAR) 5 MG tablet Take 1 tablet by mouth Daily. 90 tablet 3   • levothyroxine (SYNTHROID, LEVOTHROID) 100 MCG tablet Take 1 tablet by mouth Daily. 90 tablet 1   • metoprolol tartrate (LOPRESSOR) 50 MG tablet Take 1/2 tab twice daily. 90 tablet 1   • tamsulosin (FLOMAX) 0.4 MG capsule 24 hr capsule Take 1 capsule by mouth daily.     • warfarin (COUMADIN) 5 MG tablet Take 1 tablet by mouth Daily. Dr. Small manages coumadin. 90 tablet 1     No current facility-administered medications on file prior to visit.       BOLD indicates positive   General:  weight loss, fever, chills, appetite loss  SKIN: change in wart/mole, itching, rash, new lesions, nail changes  HEENT:   ear pain, sore throat, sinus pressure, blurry vision, eye pain, dry eyes, tinnitus  Respiratory: cough, difficulty breathing, wheezing, hemoptysis   Cardiovascular:  chest pain, shortness of breath, swelling of extremities, syncope  Gastro: abdominal pain, constipation, nausea, vomiting, diarrhea, hematemesis  Genito: hematuria, dysuria, glycosuria,  "hesitancy, frequency, incontinence  \"All other systems reviewed and negative, except as listed above.”        OBJECTIVE:  General appearance: alert, appears stated age and cooperative, walks with a cane  Head: Normocephalic, without obvious abnormality, atraumatic  Eyes: conjunctivae/corneas clear. PERRL, EOM's intact.  Ears: normal TM's and external ear canals both ears  Nose: Nares normal. Septum midline. Mucosa normal. No drainage or sinus tenderness.  Throat: no erythema, no exudate  Neck: mild anterior cervical adenopathy, supple, symmetrical, trachea midline and thyroid not enlarged, symmetric, no tenderness/mass/nodules  Lungs: clear to auscultation bilaterally  Abdomen:  Soft, non tender, non distended. Bowel sounds present all quadrants.  No rebound, no guarding, no hepatosplenomegaly in supine or decubitus positions.  Heart: regular rate and rhythm, S1, S2 normal, no murmur, click, rub or gallop  Abdomen: soft, non-tender; bowel sounds normal; no masses,  no organomegaly  Extremities: extremities normal, atraumatic, no cyanosis or edema  Skin: Skin color, texture, turgor normal. No rashes or lesions.   Lymph nodes: supraclavicular, and axillary nodes normal. Anterior cervical LN enlarged along zone 2 with tenderness. Nodes are <2cm in size.  Anterior chain without deeper cervical chain involvement.   Neurologic: Alert and oriented X 3, normal strength and tone. Normal coordination and gait. No obvious cranial nerve defects.     /62  Pulse 120  Temp 98.1 °F (36.7 °C)  Resp 20  Ht 66\" (167.6 cm)  Wt 164 lb 9.6 oz (74.7 kg)  SpO2 96%  BMI 26.57 kg/m2     ASSESSMENT/PLAN  Sam was seen today for urinary tract infection.    Diagnoses and all orders for this visit:    Hematuria  -     POCT urinalysis dipstick, automated  -     Urine Culture  -     nitrofurantoin (MACRODANTIN) 100 MG capsule; Take 1 capsule by mouth 4 (Four) Times a Day for 7 days.  -     flavoxATE (URISPAS) 100 MG tablet; Take 1 " tablet by mouth 3 (Three) Times a Day As Needed for bladder spasms.    Acute cystitis with hematuria  -     nitrofurantoin (MACRODANTIN) 100 MG capsule; Take 1 capsule by mouth 4 (Four) Times a Day for 7 days.  -     flavoxATE (URISPAS) 100 MG tablet; Take 1 tablet by mouth 3 (Three) Times a Day As Needed for bladder spasms.        POCT urinalysis dipstick, automated   Order: [id number]   Status:  Final result   Visible to patient:  No (Not Released) Dx:  Hematuria         Ref Range & Units 8:53 AM   11mo ago   2yr ago      Color Yellow, Straw, Dark Yellow, Maria Ines Red (A) Yellow YellowR   Comments: light red    Clarity, UA Clear Cloudy (A) Cloudy (A) ClearR    Glucose, UA Negative, 1000 mg/dL (3+) mg/dL Negative NegativeR NegativeR    Bilirubin Negative Negative Negative NegativeR    Ketones, UA Negative Negative Trace (A) Trace (A)R    Specific Gravity  1.005 - 1.030 1.010 1.025 1.015    Blood, UA Negative Large (A) Negative NegativeR    pH, Urine 5.0 - 8.0 6.0 5.5 7.5    Protein, POC Negative mg/dL 100 mg/dL (A) Negative NegativeR    Urobilinogen, UA Normal Normal Normal 1.0R    Leukocytes Negative Large (3+) (A) Negative NegativeR    Nitrite, UA Negative Negative Negative NegativeR   Resulting Agency  [facility]      Specimen Collected: 08/30/17  8:53 AM Last Resulted: 08/30/17  8:53 AM              R=Reference range differs from displayed range                      Definition:  Cystitis-  An infection of the bladder known as lower tract infection  Pyelonephritis - the main upper infection and involves infection of the renal parenchyma  Bacteria in urine that have the potential to injure tissues of the urinary tract and adjacent structures.    LABS:  POCT UA  UA Culture sent to lab    DDX: UTI: Simple uncomplicated cystitis, Urethritis, vaginitis, Interstitial cystitis, renal calculi, bladder outlet obstruction, Renal tuberculosis, tumors and  carcinoma, also possible but less likely, nephrolithiasis/pyelonephritis.   Males:  UTI, gonococcal urethritis, prostatitis, Epididymitis, BPH  Females:  UTI, Vuvovaginitis, cervicitis, atro phic vaginitis     Discussed various ddx and discussed treatment options.     Discussed UTI is typically a dx of history with clinical dx more important than UA.  Discussed antibiotic allergies.   If not better in 3-5 days needs to f/u with me as uncomplicated cystitis should     EDUCATION  • Drink 8 glasses of water a day  • May take Tylenol/ibuprofen as needed as directed for pain/fever.    • Consider Probiotics  • Take antibiotic as prescribed  •  If a culture was sent we will call with results  • Go to ER for fever, confusion, or other symptoms concerning for infection  • If prescribed pyridium: Will turn urine orange  • If prescribed urised:  Will turn urine blue  • We will F/U with lab results and update medications as needed.  • Follow up if no improvement  • For Women:  • Wipe front to back   • Increased yogurt to decrease risk of vaginal yeast infection while taking abx   • If taking antibiotics and using birth control at the same time it is important to use   • a backup method of birth control for 2-4 weeks as antibiotics can decrease             • efficacy of the birth control   • Safe sex encouraged  • Take showers instead of baths    Return in about 1 week (around 9/6/2017), or if symptoms worsen or fail to improve.    Lisbet Whyte, DNP, APRN-BC  08/30/2017

## 2017-09-03 LAB
BACTERIA UR CULT: ABNORMAL
BACTERIA UR CULT: ABNORMAL
OTHER ANTIBIOTIC SUSC ISLT: ABNORMAL

## 2017-09-11 ENCOUNTER — TELEPHONE (OUTPATIENT)
Dept: UROLOGY | Facility: CLINIC | Age: 82
End: 2017-09-11

## 2017-09-11 NOTE — TELEPHONE ENCOUNTER
Patient hasnt been seen in over a year they will need a follow up before we can call any meds in.    Nora Alcantar MA 9/11/17 1:1PM

## 2017-09-11 NOTE — TELEPHONE ENCOUNTER
Patient needs refill on his finasteride (PROSCAR) 5 MG tablet and his tamsulosin called into [facility].

## 2017-09-12 ENCOUNTER — OFFICE VISIT (OUTPATIENT)
Dept: UROLOGY | Facility: CLINIC | Age: 82
End: 2017-09-12

## 2017-09-12 VITALS
DIASTOLIC BLOOD PRESSURE: 68 MMHG | BODY MASS INDEX: 26.68 KG/M2 | HEIGHT: 66 IN | TEMPERATURE: 97.7 F | WEIGHT: 166 LBS | SYSTOLIC BLOOD PRESSURE: 114 MMHG

## 2017-09-12 DIAGNOSIS — R97.20 ELEVATED PROSTATE SPECIFIC ANTIGEN (PSA): Primary | ICD-10-CM

## 2017-09-12 DIAGNOSIS — N13.8 BPH (BENIGN PROSTATIC HYPERTROPHY) WITH URINARY OBSTRUCTION: ICD-10-CM

## 2017-09-12 DIAGNOSIS — N32.81 OAB (OVERACTIVE BLADDER): ICD-10-CM

## 2017-09-12 DIAGNOSIS — N40.1 BPH (BENIGN PROSTATIC HYPERTROPHY) WITH URINARY OBSTRUCTION: ICD-10-CM

## 2017-09-12 DIAGNOSIS — N30.00 ACUTE CYSTITIS WITHOUT HEMATURIA: ICD-10-CM

## 2017-09-12 LAB
BILIRUB BLD-MCNC: NEGATIVE MG/DL
CLARITY, POC: CLEAR
COLOR UR: YELLOW
GLUCOSE UR STRIP-MCNC: NEGATIVE MG/DL
KETONES UR QL: NEGATIVE
LEUKOCYTE EST, POC: NEGATIVE
NITRITE UR-MCNC: NEGATIVE MG/ML
PH UR: 6 [PH] (ref 5–8)
PROT UR STRIP-MCNC: NEGATIVE MG/DL
RBC # UR STRIP: ABNORMAL /UL
SP GR UR: 1.02 (ref 1–1.03)
UROBILINOGEN UR QL: NORMAL

## 2017-09-12 PROCEDURE — 81003 URINALYSIS AUTO W/O SCOPE: CPT | Performed by: UROLOGY

## 2017-09-12 PROCEDURE — 51798 US URINE CAPACITY MEASURE: CPT | Performed by: UROLOGY

## 2017-09-12 PROCEDURE — 99214 OFFICE O/P EST MOD 30 MIN: CPT | Performed by: UROLOGY

## 2017-09-12 NOTE — PROGRESS NOTES
Subjective    Mr. Farr is 89 y.o. male    CHIEF COMPLAINT: BPH    The patient comes back today for symptoms of BPH D seem to be worsening and he is having urgency frequency nocturia and etc. her records indicate that he is should have been on Flomax twice a day but it looks like his present medications should show that he is taking Proscar and Avodart apparently when I last saw him and did bump his Flomax up to twice a day but has been some time ago and he ran out of medications.    He does recall ever that the medication really was helping him significantly.    He is also had a recent urinary tract infection with Escherichia coli he was given by mouth antibiotics no symptoms seem to have improved with the dysuria etc.    He has had no gross hematuria there is been no flank pain no fever associated with this    Also one of his biggest complaint is urgency frequency and urge incontinence where he cannot make it to the bathroom obviously that his age his mobility is somewhat inhibited but certainly this seems to be getting worse      History of Present Illness      The following portions of the patient's history were reviewed and updated as appropriate: allergies, current medications, past family history, past medical history, past social history, past surgical history and problem list.    Review of Systems   Constitutional: Negative for chills and fever.   Gastrointestinal: Negative for abdominal pain, anal bleeding and blood in stool.   Genitourinary: Positive for difficulty urinating, frequency and urgency. Negative for flank pain and hematuria.         Current Outpatient Prescriptions:   •  alendronate (FOSAMAX) 70 MG tablet, Take 1 tablet by mouth Every 7 (Seven) Days., Disp: 90 tablet, Rfl: 1  •  amLODIPine (NORVASC) 10 MG tablet, Take 1 tablet by mouth Daily., Disp: 90 tablet, Rfl: 1  •  Calcium-Vitamin D (CALTRATE 600 PLUS-VIT D PO), Take  by mouth daily., Disp: , Rfl:   •  dutasteride (AVODART) 0.5 MG  capsule, Daily., Disp: , Rfl:   •  ferrous sulfate 325 (65 FE) MG tablet, Take 325 mg by mouth daily with breakfast., Disp: , Rfl:   •  finasteride (PROSCAR) 5 MG tablet, Take 1 tablet by mouth Daily., Disp: 90 tablet, Rfl: 3  •  flavoxATE (URISPAS) 100 MG tablet, Take 1 tablet by mouth 3 (Three) Times a Day As Needed for bladder spasms., Disp: 9 tablet, Rfl: 0  •  levothyroxine (SYNTHROID, LEVOTHROID) 100 MCG tablet, Take 1 tablet by mouth Daily., Disp: 90 tablet, Rfl: 1  •  metoprolol tartrate (LOPRESSOR) 50 MG tablet, Take 1/2 tab twice daily., Disp: 90 tablet, Rfl: 1  •  tamsulosin (FLOMAX) 0.4 MG capsule 24 hr capsule, Take 1 capsule by mouth daily., Disp: , Rfl:   •  warfarin (COUMADIN) 5 MG tablet, Take 1 tablet by mouth Daily. Dr. 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   • Hiatal hernia 6/12/2017   • History of anticoagulant therapy    • Hx of colon cancer, stage III    • Hypothyroidism    • Iron deficiency anemia    • Osteoporosis        Past Surgical History:   Procedure Laterality Date   • COLON SURGERY      colon cancer stage 3   • PACEMAKER IMPLANTATION  2006   • PACEMAKER REPLACEMENT  2012   • TYMPANOPLASTY      replaced       Social History     Social History   • Marital status:      Spouse name: N/A   • Number of children: N/A   • Years of education: N/A     Social History Main Topics   • Smoking status: Never Smoker   • Smokeless tobacco: Never Used   • Alcohol use Yes      Comment: wine occassionally   • Drug use: No   • Sexual activity: Defer     Other Topics Concern   • None     Social History Narrative       Family History   Problem Relation Age of Onset   • Colon cancer Mother    • Heart attack Father    • COPD  "Sister    • No Known Problems Daughter    • Cancer Son    • Appendicitis Maternal Grandmother    • Heart attack Maternal Grandfather    • No Known Problems Paternal Grandmother    • No Known Problems Paternal Grandfather    • No Known Problems Sister    • No Known Problems Sister    • No Known Problems Sister    • Heart attack Son    • No Known Problems Son    • No Known Problems Son    • No Known Problems Daughter    • No Known Problems Daughter    • No Known Problems Daughter        Objective    /68  Temp 97.7 °F (36.5 °C)  Ht 66\" (167.6 cm)  Wt 166 lb (75.3 kg)  BMI 26.79 kg/m2    Physical Exam      Office Visit on 08/30/2017   Component Date Value Ref Range Status   • Color 08/30/2017 Red* Yellow, Straw, Dark Yellow, Maria Ines Final    light red   • Clarity, UA 08/30/2017 Cloudy* Clear Final   • Glucose, UA 08/30/2017 Negative  Negative, 1000 mg/dL (3+) mg/dL Final   • Bilirubin 08/30/2017 Negative  Negative Final   • Ketones, UA 08/30/2017 Negative  Negative Final   • Specific Gravity  08/30/2017 1.010  1.005 - 1.030 Final   • Blood, UA 08/30/2017 Large* Negative Final   • pH, Urine 08/30/2017 6.0  5.0 - 8.0 Final   • Protein, POC 08/30/2017 100 mg/dL* Negative mg/dL Final   • Urobilinogen, UA 08/30/2017 Normal  Normal Final   • Leukocytes 08/30/2017 Large (3+)* Negative Final   • Nitrite, UA 08/30/2017 Negative  Negative Final   • Urine Culture 08/30/2017 Final report*  Final   • Result 1 08/30/2017 Escherichia coli*  Final    50,000-100,000 colony forming units per mL   • Susceptibility Testing 08/30/2017 Comment   Final    Comment:       ** S = Susceptible; I = Intermediate; R = Resistant **                     P = Positive; N = Negative              MICS are expressed in micrograms per mL     Antibiotic                 RSLT#1    RSLT#2    RSLT#3    RSLT#4  Amoxicillin/Clavulanic Acid    S  Ampicillin                     S  Cefepime                       S  Ceftriaxone                    S  Cefuroxime  "                    S  Cephalothin                    S  Ciprofloxacin                  S  Ertapenem                      S  Gentamicin                     S  Imipenem                       S  Levofloxacin                   S  Nitrofurantoin                 S  Piperacillin                   S  Tetracycline                   S  Tobramycin                     S  Trimethoprim/Sulfa             S         Results for orders placed or performed in visit on 09/12/17   POC Urinalysis Dipstick, Automated   Result Value Ref Range    Color Yellow Yellow, Straw, Dark Yellow, Maria Ines    Clarity, UA Clear Clear    Glucose, UA Negative Negative, 1000 mg/dL (3+) mg/dL    Bilirubin Negative Negative    Ketones, UA Negative Negative    Specific Gravity  1.025 1.005 - 1.030    Blood, UA Trace (A) Negative    pH, Urine 6.0 5.0 - 8.0    Protein, POC Negative Negative mg/dL    Urobilinogen, UA Normal Normal    Leukocytes Negative Negative    Nitrite, UA Negative Negative   Microscopic Urinalysis  I inspected the urine myself based on the clinical situation including the dipstick urine. The urine is spun in a centrifuge for three minutes. The spun urine shows 7-12 rbc/hpf, 7-12 wbc/hpf, none epi/hpf, negative bacteria, negative crystals, and negative casts.     Bladder Scan interpretation  Estimation of residual urine via abdominal ultrasound  Residual Urine: 55 ml  Indication: frequency   Position: Supine  Examination: Incremental scanning of the suprapubic area using 3 MHz transducer using copious amounts of acoustic gel.   Findings: An anechoic area was demonstrated which represented the bladder, with measurement of residual urine as noted. I inspected this myself. In that the residual urine was stable or insignificant, no treatment will be necessary at this time.               Assessment and Plan    Sam was seen today for elevated psa.    Diagnoses and all orders for this visit:    Elevated prostate specific antigen (PSA)  -     POC  Urinalysis Dipstick, Automated    BPH (benign prostatic hypertrophy) with urinary obstruction    Acute cystitis without hematuria    Plan--I discussed with Mr. Ms. Farr the findings again I suggested that he stop either the Avodart or the Proscar I think is gotten the posterior recently refilled so he will continue that.    I gave him some samples of Rapaflo to take 8 mg to see if this might help his symptoms and also gave him some samples of Myrbetriq to take 25 mg daily.    , I gave him 3 weeks worth of each of these medications, seen back in 3 weeks and see how he is doing if his symptoms worsen start having fever etc. prior then he will call

## 2017-09-15 ENCOUNTER — ANTI-COAG VISIT (OUTPATIENT)
Dept: CARDIOLOGY | Age: 82
End: 2017-09-15
Payer: MEDICARE

## 2017-09-15 DIAGNOSIS — I48.20 CHRONIC ATRIAL FIBRILLATION (HCC): ICD-10-CM

## 2017-09-15 LAB
INTERNATIONAL NORMALIZATION RATIO, POC: 2.8
PROTHROMBIN TIME, POC: NORMAL

## 2017-09-15 PROCEDURE — 85610 PROTHROMBIN TIME: CPT | Performed by: CLINICAL NURSE SPECIALIST

## 2017-09-15 PROCEDURE — 1036F TOBACCO NON-USER: CPT | Performed by: CLINICAL NURSE SPECIALIST

## 2017-09-29 ENCOUNTER — OFFICE VISIT (OUTPATIENT)
Dept: ONCOLOGY | Facility: CLINIC | Age: 82
End: 2017-09-29

## 2017-09-29 ENCOUNTER — LAB (OUTPATIENT)
Dept: LAB | Facility: HOSPITAL | Age: 82
End: 2017-09-29

## 2017-09-29 VITALS
RESPIRATION RATE: 16 BRPM | WEIGHT: 165.2 LBS | HEART RATE: 92 BPM | BODY MASS INDEX: 26.55 KG/M2 | TEMPERATURE: 97.5 F | OXYGEN SATURATION: 96 % | HEIGHT: 66 IN | DIASTOLIC BLOOD PRESSURE: 82 MMHG | SYSTOLIC BLOOD PRESSURE: 138 MMHG

## 2017-09-29 DIAGNOSIS — C18.9 MALIGNANT NEOPLASM OF COLON, UNSPECIFIED SITE: Primary | ICD-10-CM

## 2017-09-29 DIAGNOSIS — C18.6 MALIGNANT NEOPLASM OF DESCENDING COLON (HCC): Primary | ICD-10-CM

## 2017-09-29 LAB
ALBUMIN SERPL-MCNC: 4.6 G/DL (ref 3.5–5)
ALBUMIN/GLOB SERPL: 1.4 G/DL (ref 1.1–2.5)
ALP SERPL-CCNC: 55 U/L (ref 24–120)
ALT SERPL W P-5'-P-CCNC: 30 U/L (ref 0–54)
ANION GAP SERPL CALCULATED.3IONS-SCNC: 13 MMOL/L (ref 4–13)
AST SERPL-CCNC: 31 U/L (ref 7–45)
BASOPHILS # BLD AUTO: 0.02 10*3/MM3 (ref 0–0.2)
BASOPHILS NFR BLD AUTO: 0.3 % (ref 0–2)
BILIRUB SERPL-MCNC: 0.6 MG/DL (ref 0.1–1)
BUN BLD-MCNC: 17 MG/DL (ref 5–21)
BUN/CREAT SERPL: 20 (ref 7–25)
CALCIUM SPEC-SCNC: 9.1 MG/DL (ref 8.4–10.4)
CHLORIDE SERPL-SCNC: 100 MMOL/L (ref 98–110)
CO2 SERPL-SCNC: 30 MMOL/L (ref 24–31)
CREAT BLD-MCNC: 0.85 MG/DL (ref 0.5–1.4)
DEPRECATED RDW RBC AUTO: 55.2 FL (ref 40–54)
EOSINOPHIL # BLD AUTO: 0.11 10*3/MM3 (ref 0–0.7)
EOSINOPHIL NFR BLD AUTO: 1.4 % (ref 0–4)
ERYTHROCYTE [DISTWIDTH] IN BLOOD BY AUTOMATED COUNT: 16.3 % (ref 12–15)
GFR SERPL CREATININE-BSD FRML MDRD: 84 ML/MIN/1.73
GLOBULIN UR ELPH-MCNC: 3.2 GM/DL
GLUCOSE BLD-MCNC: 89 MG/DL (ref 70–100)
HCT VFR BLD AUTO: 45.7 % (ref 40–52)
HGB BLD-MCNC: 15.3 G/DL (ref 14–18)
HOLD SPECIMEN: NORMAL
IMM GRANULOCYTES # BLD: 0.03 10*3/MM3 (ref 0–0.03)
IMM GRANULOCYTES NFR BLD: 0.4 % (ref 0–5)
LYMPHOCYTES # BLD AUTO: 3.14 10*3/MM3 (ref 0.72–4.86)
LYMPHOCYTES NFR BLD AUTO: 39.8 % (ref 15–45)
MCH RBC QN AUTO: 30.9 PG (ref 28–32)
MCHC RBC AUTO-ENTMCNC: 33.5 G/DL (ref 33–36)
MCV RBC AUTO: 92.3 FL (ref 82–95)
MONOCYTES # BLD AUTO: 0.81 10*3/MM3 (ref 0.19–1.3)
MONOCYTES NFR BLD AUTO: 10.3 % (ref 4–12)
NEUTROPHILS # BLD AUTO: 3.77 10*3/MM3 (ref 1.87–8.4)
NEUTROPHILS NFR BLD AUTO: 47.8 % (ref 39–78)
PLATELET # BLD AUTO: 189 10*3/MM3 (ref 130–400)
PMV BLD AUTO: 9.8 FL (ref 6–12)
POTASSIUM BLD-SCNC: 4.2 MMOL/L (ref 3.5–5.3)
PROT SERPL-MCNC: 7.8 G/DL (ref 6.3–8.7)
RBC # BLD AUTO: 4.95 10*6/MM3 (ref 4.8–5.9)
SODIUM BLD-SCNC: 143 MMOL/L (ref 135–145)
WBC NRBC COR # BLD: 7.88 10*3/MM3 (ref 4.8–10.8)

## 2017-09-29 PROCEDURE — 80053 COMPREHEN METABOLIC PANEL: CPT | Performed by: INTERNAL MEDICINE

## 2017-09-29 PROCEDURE — 85025 COMPLETE CBC W/AUTO DIFF WBC: CPT | Performed by: INTERNAL MEDICINE

## 2017-09-29 PROCEDURE — 36415 COLL VENOUS BLD VENIPUNCTURE: CPT

## 2017-09-29 PROCEDURE — 99214 OFFICE O/P EST MOD 30 MIN: CPT | Performed by: INTERNAL MEDICINE

## 2017-10-03 ENCOUNTER — OFFICE VISIT (OUTPATIENT)
Dept: UROLOGY | Facility: CLINIC | Age: 82
End: 2017-10-03

## 2017-10-03 VITALS — HEIGHT: 66 IN | WEIGHT: 165 LBS | TEMPERATURE: 98 F | BODY MASS INDEX: 26.52 KG/M2

## 2017-10-03 DIAGNOSIS — N40.1 BENIGN PROSTATIC HYPERPLASIA WITH LOWER URINARY TRACT SYMPTOMS, SYMPTOM DETAILS UNSPECIFIED: ICD-10-CM

## 2017-10-03 DIAGNOSIS — N30.00 ACUTE CYSTITIS WITHOUT HEMATURIA: ICD-10-CM

## 2017-10-03 DIAGNOSIS — N32.81 OAB (OVERACTIVE BLADDER): ICD-10-CM

## 2017-10-03 DIAGNOSIS — R97.20 ELEVATED PROSTATE SPECIFIC ANTIGEN (PSA): Primary | ICD-10-CM

## 2017-10-03 LAB
BILIRUB BLD-MCNC: NEGATIVE MG/DL
CLARITY, POC: CLEAR
COLOR UR: YELLOW
GLUCOSE UR STRIP-MCNC: NEGATIVE MG/DL
KETONES UR QL: NEGATIVE
LEUKOCYTE EST, POC: NEGATIVE
NITRITE UR-MCNC: NEGATIVE MG/ML
PH UR: 5 [PH] (ref 5–8)
PROT UR STRIP-MCNC: NEGATIVE MG/DL
RBC # UR STRIP: NEGATIVE /UL
SP GR UR: 1.02 (ref 1–1.03)
UROBILINOGEN UR QL: NORMAL

## 2017-10-03 PROCEDURE — 81003 URINALYSIS AUTO W/O SCOPE: CPT | Performed by: UROLOGY

## 2017-10-03 PROCEDURE — 99213 OFFICE O/P EST LOW 20 MIN: CPT | Performed by: UROLOGY

## 2017-10-03 NOTE — PROGRESS NOTES
Subjective    Mr. Farr is 89 y.o. male    CHIEF COMPLAINT: BPH    The patient comes back today for follow-up of BPH and also nocturia and frequency.    On last CT we have given him samples of Rapaflo and Myrbetriq he is run out of both about 2 days ago that he is still doing okay.    He says that when he thinks about at the symptoms that are better on the urgency he has more time to make it to the bathroom and he has a decreased frequency also he is not getting up as much at night to be those are probably the effects of the Myrbetriq and not the Rapaflo.    He denies any gross hematuria there is no flank pain no urinary tract infections etc. since we have since we seen him his urinalysis today is clear    He does have a history of acute cystitis but again that does not seem to have re-recurred and again his urine is clear today      History of Present Illness      The following portions of the patient's history were reviewed and updated as appropriate: allergies, current medications, past family history, past medical history, past social history, past surgical history and problem list.    Review of Systems   Constitutional: Positive for fatigue. Negative for chills and fever.   Gastrointestinal: Negative for abdominal distention, abdominal pain, anal bleeding, blood in stool and nausea.   Genitourinary: Negative for difficulty urinating, dysuria, flank pain, frequency, hematuria and urgency.   Psychiatric/Behavioral: Negative.  Negative for agitation and confusion.         Current Outpatient Prescriptions:   •  alendronate (FOSAMAX) 70 MG tablet, Take 1 tablet by mouth Every 7 (Seven) Days., Disp: 90 tablet, Rfl: 1  •  amLODIPine (NORVASC) 10 MG tablet, Take 1 tablet by mouth Daily., Disp: 90 tablet, Rfl: 1  •  Calcium-Vitamin D (CALTRATE 600 PLUS-VIT D PO), Take  by mouth daily., Disp: , Rfl:   •  dutasteride (AVODART) 0.5 MG capsule, Daily., Disp: , Rfl:   •  ferrous sulfate 325 (65 FE) MG tablet, Take 325 mg  by mouth daily with breakfast., Disp: , Rfl:   •  finasteride (PROSCAR) 5 MG tablet, Take 1 tablet by mouth Daily., Disp: 90 tablet, Rfl: 3  •  flavoxATE (URISPAS) 100 MG tablet, Take 1 tablet by mouth 3 (Three) Times a Day As Needed for bladder spasms., Disp: 9 tablet, Rfl: 0  •  levothyroxine (SYNTHROID, LEVOTHROID) 100 MCG tablet, Take 1 tablet by mouth Daily., Disp: 90 tablet, Rfl: 1  •  metoprolol tartrate (LOPRESSOR) 50 MG tablet, Take 1/2 tab twice daily., Disp: 90 tablet, Rfl: 1  •  Mirabegron ER (MYRBETRIQ) 25 MG tablet sustained-release 24 hour 24 hr tablet, Take 1 tablet by mouth Daily., Disp: 30 tablet, Rfl: 5  •  tamsulosin (FLOMAX) 0.4 MG capsule 24 hr capsule, Take 1 capsule by mouth daily., Disp: , Rfl:   •  warfarin (COUMADIN) 5 MG tablet, Take 1 tablet by mouth Daily. Dr. 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   • Hiatal hernia 6/12/2017   • History of anticoagulant therapy    • Hx of colon cancer, stage III    • Hypothyroidism    • Iron deficiency anemia    • Osteoporosis        Past Surgical History:   Procedure Laterality Date   • COLON SURGERY      colon cancer stage 3   • PACEMAKER IMPLANTATION  2006   • PACEMAKER REPLACEMENT  2012   • TYMPANOPLASTY      replaced       Social History     Social History   • Marital status:      Spouse name: N/A   • Number of children: N/A   • Years of education: N/A     Social History Main Topics   • Smoking status: Never Smoker   • Smokeless tobacco: Never Used   • Alcohol use Yes      Comment: wine occassionally   • Drug use: No   • Sexual activity: Defer     Other Topics Concern   • None     Social History Narrative       Family History   Problem Relation Age of Onset   • Colon  "cancer Mother    • Heart attack Father    • COPD Sister    • No Known Problems Daughter    • Cancer Son    • Appendicitis Maternal Grandmother    • Heart attack Maternal Grandfather    • No Known Problems Paternal Grandmother    • No Known Problems Paternal Grandfather    • No Known Problems Sister    • No Known Problems Sister    • No Known Problems Sister    • Heart attack Son    • No Known Problems Son    • No Known Problems Son    • No Known Problems Daughter    • No Known Problems Daughter    • No Known Problems Daughter        Objective    Temp 98 °F (36.7 °C)  Ht 66\" (167.6 cm)  Wt 165 lb (74.8 kg)  BMI 26.63 kg/m2    Physical Exam      Lab on 09/29/2017   Component Date Value Ref Range Status   • Extra Tube 09/29/2017 Hold for add-ons.   Final    Auto resulted.       Results for orders placed or performed in visit on 10/03/17   POC Urinalysis Dipstick, Automated   Result Value Ref Range    Color Yellow Yellow, Straw, Dark Yellow, Maria Ines    Clarity, UA Clear Clear    Glucose, UA Negative Negative, 1000 mg/dL (3+) mg/dL    Bilirubin Negative Negative    Ketones, UA Negative Negative    Specific Gravity  1.020 1.005 - 1.030    Blood, UA Negative Negative    pH, Urine 5.0 5.0 - 8.0    Protein, POC Negative Negative mg/dL    Urobilinogen, UA Normal Normal    Leukocytes Negative Negative    Nitrite, UA Negative Negative       Assessment and Plan    Sam was seen today for elevated psa.    Diagnoses and all orders for this visit:    Elevated prostate specific antigen (PSA)  -     POC Urinalysis Dipstick, Automated    Acute cystitis without hematuria    OAB (overactive bladder)  -     Mirabegron ER (MYRBETRIQ) 25 MG tablet sustained-release 24 hour 24 hr tablet; Take 1 tablet by mouth Daily.    Benign prostatic hyperplasia with lower urinary tract symptoms, symptom details unspecified    Plan--from a BPH point review the patient and continue the Proscar I am not going to give him any more ROM Rapaflo at this " time we will see if how he does just on the Myrbetriq by itself    I went ahead and again refilled his mirabegron form 25 mg if this is not working for him he will let me know

## 2017-10-09 ENCOUNTER — TELEPHONE (OUTPATIENT)
Dept: UROLOGY | Facility: CLINIC | Age: 82
End: 2017-10-09

## 2017-10-09 NOTE — TELEPHONE ENCOUNTER
Patient called about his Mirabegron ER (MYRBETRIQ) 25 MG tablet sustained-release 24 hour 24 hr tablet when he went to pharmacy to get it the cost was 500.00 and he cannot afford this. They want to know if there is something else he could take.

## 2017-10-12 NOTE — TELEPHONE ENCOUNTER
MD Nora Spicer MA       10/11/2017 at 3:53PM              There should be a discount card also see what the drug grafts if we cannot getting some compassionate supply in that this is only one that seemed to work for him well

## 2017-10-12 NOTE — TELEPHONE ENCOUNTER
Called Mr. Farr and couldn't reach him, I left a message stating that I have a discount card up front for him to try.  10/12/2017 8:09AM

## 2017-10-13 ENCOUNTER — ANTI-COAG VISIT (OUTPATIENT)
Dept: CARDIOLOGY | Age: 82
End: 2017-10-13
Payer: MEDICARE

## 2017-10-13 DIAGNOSIS — I48.20 CHRONIC ATRIAL FIBRILLATION (HCC): ICD-10-CM

## 2017-10-13 LAB
INTERNATIONAL NORMALIZATION RATIO, POC: 2.3
PROTHROMBIN TIME, POC: NORMAL

## 2017-10-13 PROCEDURE — 1036F TOBACCO NON-USER: CPT | Performed by: CLINICAL NURSE SPECIALIST

## 2017-10-13 PROCEDURE — 85610 PROTHROMBIN TIME: CPT | Performed by: CLINICAL NURSE SPECIALIST

## 2017-10-18 ENCOUNTER — TELEPHONE (OUTPATIENT)
Dept: UROLOGY | Facility: CLINIC | Age: 82
End: 2017-10-18

## 2017-10-18 NOTE — TELEPHONE ENCOUNTER
Pt wife called and said they cant afford the medication that was prescribed last time they was seen.  She states that the card that she was given for the medicine doesn't work because they have insurance.  She is wondering if there is another medication she can have prescribed to her  so they can afford it.  Thanks!

## 2017-10-19 ENCOUNTER — TELEPHONE (OUTPATIENT)
Dept: UROLOGY | Facility: CLINIC | Age: 82
End: 2017-10-19

## 2017-10-19 NOTE — TELEPHONE ENCOUNTER
----- Message from Eduard Grigsby MD sent at 10/19/2017 11:33 AM CDT -----  See if we can give samples  ----- Message -----     From: Nora Alcantar MA     Sent: 10/19/2017   6:58 AM       To: Eduard Grigsby MD    Myrbetriq 25 the discount card wouldn't work due to them having insurance. The only other thing we could do with this med is if we have give samples each month. Is this what you want to do or do you want to try something different.

## 2017-11-06 ENCOUNTER — FLU SHOT (OUTPATIENT)
Dept: FAMILY MEDICINE CLINIC | Facility: CLINIC | Age: 82
End: 2017-11-06

## 2017-11-06 PROCEDURE — 90662 IIV NO PRSV INCREASED AG IM: CPT | Performed by: FAMILY MEDICINE

## 2017-11-06 PROCEDURE — 90471 IMMUNIZATION ADMIN: CPT | Performed by: FAMILY MEDICINE

## 2017-11-15 ENCOUNTER — ANTI-COAG VISIT (OUTPATIENT)
Dept: CARDIOLOGY | Age: 82
End: 2017-11-15
Payer: MEDICARE

## 2017-11-15 DIAGNOSIS — I48.20 CHRONIC ATRIAL FIBRILLATION (HCC): ICD-10-CM

## 2017-11-15 LAB
INTERNATIONAL NORMALIZATION RATIO, POC: 3.6
PROTHROMBIN TIME, POC: NORMAL

## 2017-11-15 PROCEDURE — 85610 PROTHROMBIN TIME: CPT | Performed by: CLINICAL NURSE SPECIALIST

## 2017-11-29 ENCOUNTER — TELEPHONE (OUTPATIENT)
Dept: CARDIOLOGY | Age: 82
End: 2017-11-29

## 2017-12-08 NOTE — TELEPHONE ENCOUNTER
Patient's wife called about getting his scripts refilled. He needs a refill on finasteride (PROSCAR) 5 MG tablet and Mirabegron ER (MYRBETRIQ) 25 MG tablet sustained-release 24 hour 24 hr tablet . She said that Dr Grigsby did give him samples of the Myrbetriq the last time. She asked if she could  both scripts and I told her we normally send it to the pharmacy. She wants it to go to [facility] pharmacy.

## 2017-12-08 NOTE — TELEPHONE ENCOUNTER
Contacted Patient and told him I can call both medications into the [facility], he verbalized the OK and I escribed those in.

## 2017-12-21 NOTE — TELEPHONE ENCOUNTER
Patient's wife called requesting refill for Amlodipine. He has an appointment in January with Johnson

## 2018-01-01 ENCOUNTER — OFFICE VISIT (OUTPATIENT)
Dept: ONCOLOGY | Facility: CLINIC | Age: 83
End: 2018-01-01

## 2018-01-01 ENCOUNTER — TELEPHONE (OUTPATIENT)
Dept: UROLOGY | Facility: CLINIC | Age: 83
End: 2018-01-01

## 2018-01-01 ENCOUNTER — OFFICE VISIT (OUTPATIENT)
Dept: CARDIOLOGY | Age: 83
End: 2018-01-01
Payer: MEDICARE

## 2018-01-01 ENCOUNTER — OFFICE VISIT (OUTPATIENT)
Dept: FAMILY MEDICINE CLINIC | Facility: CLINIC | Age: 83
End: 2018-01-01

## 2018-01-01 ENCOUNTER — ANTI-COAG VISIT (OUTPATIENT)
Dept: CARDIOLOGY | Age: 83
End: 2018-01-01
Payer: MEDICARE

## 2018-01-01 ENCOUNTER — TELEPHONE (OUTPATIENT)
Dept: CARDIOLOGY | Age: 83
End: 2018-01-01

## 2018-01-01 ENCOUNTER — TELEPHONE (OUTPATIENT)
Dept: FAMILY MEDICINE CLINIC | Facility: CLINIC | Age: 83
End: 2018-01-01

## 2018-01-01 ENCOUNTER — APPOINTMENT (OUTPATIENT)
Dept: LAB | Facility: HOSPITAL | Age: 83
End: 2018-01-01

## 2018-01-01 ENCOUNTER — HOSPITAL ENCOUNTER (INPATIENT)
Facility: HOSPITAL | Age: 83
LOS: 4 days | Discharge: HOSPICE | End: 2018-11-30
Attending: EMERGENCY MEDICINE | Admitting: NEUROLOGICAL SURGERY

## 2018-01-01 ENCOUNTER — APPOINTMENT (OUTPATIENT)
Dept: GENERAL RADIOLOGY | Facility: HOSPITAL | Age: 83
End: 2018-01-01

## 2018-01-01 ENCOUNTER — CLINICAL SUPPORT (OUTPATIENT)
Dept: FAMILY MEDICINE CLINIC | Facility: CLINIC | Age: 83
End: 2018-01-01

## 2018-01-01 ENCOUNTER — APPOINTMENT (OUTPATIENT)
Dept: CT IMAGING | Facility: HOSPITAL | Age: 83
End: 2018-01-01

## 2018-01-01 ENCOUNTER — OFFICE VISIT (OUTPATIENT)
Dept: UROLOGY | Facility: CLINIC | Age: 83
End: 2018-01-01

## 2018-01-01 ENCOUNTER — READMISSION MANAGEMENT (OUTPATIENT)
Dept: CALL CENTER | Facility: HOSPITAL | Age: 83
End: 2018-01-01

## 2018-01-01 ENCOUNTER — LAB (OUTPATIENT)
Dept: LAB | Facility: HOSPITAL | Age: 83
End: 2018-01-01

## 2018-01-01 VITALS
HEIGHT: 66 IN | RESPIRATION RATE: 18 BRPM | BODY MASS INDEX: 26.9 KG/M2 | DIASTOLIC BLOOD PRESSURE: 68 MMHG | TEMPERATURE: 97.3 F | WEIGHT: 167.4 LBS | OXYGEN SATURATION: 93 % | HEART RATE: 102 BPM | SYSTOLIC BLOOD PRESSURE: 122 MMHG

## 2018-01-01 VITALS
SYSTOLIC BLOOD PRESSURE: 146 MMHG | BODY MASS INDEX: 26.52 KG/M2 | OXYGEN SATURATION: 96 % | HEART RATE: 74 BPM | WEIGHT: 165 LBS | DIASTOLIC BLOOD PRESSURE: 80 MMHG | HEIGHT: 66 IN | RESPIRATION RATE: 20 BRPM

## 2018-01-01 VITALS
OXYGEN SATURATION: 96 % | SYSTOLIC BLOOD PRESSURE: 146 MMHG | HEART RATE: 84 BPM | BODY MASS INDEX: 25.65 KG/M2 | HEIGHT: 66 IN | WEIGHT: 159.6 LBS | DIASTOLIC BLOOD PRESSURE: 82 MMHG | RESPIRATION RATE: 16 BRPM | TEMPERATURE: 97 F

## 2018-01-01 VITALS
HEART RATE: 86 BPM | SYSTOLIC BLOOD PRESSURE: 124 MMHG | BODY MASS INDEX: 27.16 KG/M2 | DIASTOLIC BLOOD PRESSURE: 76 MMHG | HEIGHT: 66 IN | WEIGHT: 169 LBS

## 2018-01-01 VITALS
BODY MASS INDEX: 26.63 KG/M2 | HEIGHT: 66 IN | WEIGHT: 165.7 LBS | TEMPERATURE: 101.7 F | SYSTOLIC BLOOD PRESSURE: 110 MMHG | OXYGEN SATURATION: 90 % | RESPIRATION RATE: 20 BRPM | HEART RATE: 85 BPM | DIASTOLIC BLOOD PRESSURE: 68 MMHG

## 2018-01-01 VITALS
HEART RATE: 112 BPM | DIASTOLIC BLOOD PRESSURE: 70 MMHG | SYSTOLIC BLOOD PRESSURE: 122 MMHG | WEIGHT: 168.6 LBS | OXYGEN SATURATION: 95 % | TEMPERATURE: 97.9 F | RESPIRATION RATE: 24 BRPM | BODY MASS INDEX: 27.1 KG/M2 | HEIGHT: 66 IN

## 2018-01-01 VITALS
DIASTOLIC BLOOD PRESSURE: 76 MMHG | HEART RATE: 102 BPM | HEIGHT: 66 IN | TEMPERATURE: 97.8 F | OXYGEN SATURATION: 96 % | SYSTOLIC BLOOD PRESSURE: 128 MMHG | BODY MASS INDEX: 26.9 KG/M2 | WEIGHT: 167.4 LBS | RESPIRATION RATE: 18 BRPM

## 2018-01-01 VITALS
BODY MASS INDEX: 27.64 KG/M2 | HEART RATE: 60 BPM | HEIGHT: 66 IN | WEIGHT: 172 LBS | SYSTOLIC BLOOD PRESSURE: 122 MMHG | DIASTOLIC BLOOD PRESSURE: 62 MMHG

## 2018-01-01 VITALS — WEIGHT: 160 LBS | TEMPERATURE: 97.9 F | HEIGHT: 66 IN | BODY MASS INDEX: 25.71 KG/M2

## 2018-01-01 DIAGNOSIS — C18.9 MALIGNANT NEOPLASM OF COLON, UNSPECIFIED PART OF COLON (HCC): Primary | ICD-10-CM

## 2018-01-01 DIAGNOSIS — M81.0 OSTEOPOROSIS WITHOUT CURRENT PATHOLOGICAL FRACTURE, UNSPECIFIED OSTEOPOROSIS TYPE: ICD-10-CM

## 2018-01-01 DIAGNOSIS — I48.20 CHRONIC ATRIAL FIBRILLATION (HCC): ICD-10-CM

## 2018-01-01 DIAGNOSIS — R26.81 UNSTEADY GAIT: ICD-10-CM

## 2018-01-01 DIAGNOSIS — E03.8 OTHER SPECIFIED HYPOTHYROIDISM: ICD-10-CM

## 2018-01-01 DIAGNOSIS — R97.20 ELEVATED PROSTATE SPECIFIC ANTIGEN (PSA): Primary | ICD-10-CM

## 2018-01-01 DIAGNOSIS — R97.20 ELEVATED PROSTATE SPECIFIC ANTIGEN (PSA): ICD-10-CM

## 2018-01-01 DIAGNOSIS — S51.012A SKIN TEAR OF LEFT ELBOW WITHOUT COMPLICATION, INITIAL ENCOUNTER: ICD-10-CM

## 2018-01-01 DIAGNOSIS — I10 ESSENTIAL HYPERTENSION: ICD-10-CM

## 2018-01-01 DIAGNOSIS — I48.20 ATRIAL FIBRILLATION, CHRONIC (HCC): Primary | ICD-10-CM

## 2018-01-01 DIAGNOSIS — I48.0 PAROXYSMAL ATRIAL FIBRILLATION (HCC): ICD-10-CM

## 2018-01-01 DIAGNOSIS — N32.81 OVERACTIVE BLADDER: ICD-10-CM

## 2018-01-01 DIAGNOSIS — D50.8 OTHER IRON DEFICIENCY ANEMIA: ICD-10-CM

## 2018-01-01 DIAGNOSIS — R29.6 FREQUENT FALLS: ICD-10-CM

## 2018-01-01 DIAGNOSIS — Z00.00 MEDICARE ANNUAL WELLNESS VISIT, SUBSEQUENT: Primary | ICD-10-CM

## 2018-01-01 DIAGNOSIS — Z23 FLU VACCINE NEED: Primary | ICD-10-CM

## 2018-01-01 DIAGNOSIS — Z95.0 PACEMAKER: ICD-10-CM

## 2018-01-01 DIAGNOSIS — I10 ESSENTIAL HYPERTENSION: Primary | ICD-10-CM

## 2018-01-01 DIAGNOSIS — N32.81 OAB (OVERACTIVE BLADDER): ICD-10-CM

## 2018-01-01 DIAGNOSIS — Z95.0 PACEMAKER: Primary | ICD-10-CM

## 2018-01-01 DIAGNOSIS — D50.8 OTHER IRON DEFICIENCY ANEMIA: Primary | ICD-10-CM

## 2018-01-01 DIAGNOSIS — E66.3 OVERWEIGHT (BMI 25.0-29.9): ICD-10-CM

## 2018-01-01 DIAGNOSIS — I61.5 RIGHT-SIDED NONTRAUMATIC INTRAVENTRICULAR INTRACEREBRAL HEMORRHAGE (HCC): ICD-10-CM

## 2018-01-01 DIAGNOSIS — R31.9 HEMATURIA, UNSPECIFIED TYPE: ICD-10-CM

## 2018-01-01 DIAGNOSIS — I48.0 PAROXYSMAL ATRIAL FIBRILLATION (HCC): Primary | ICD-10-CM

## 2018-01-01 DIAGNOSIS — N40.0 BENIGN PROSTATIC HYPERPLASIA, UNSPECIFIED WHETHER LOWER URINARY TRACT SYMPTOMS PRESENT: ICD-10-CM

## 2018-01-01 DIAGNOSIS — I25.10 CORONARY ARTERY DISEASE INVOLVING NATIVE CORONARY ARTERY OF NATIVE HEART WITHOUT ANGINA PECTORIS: ICD-10-CM
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
## 2018-01-01 DIAGNOSIS — R79.89 ABNORMAL CBC: ICD-10-CM

## 2018-01-01 DIAGNOSIS — N30.01 ACUTE CYSTITIS WITH HEMATURIA: ICD-10-CM

## 2018-01-01 DIAGNOSIS — I48.20 ATRIAL FIBRILLATION, CHRONIC (HCC): ICD-10-CM

## 2018-01-01 DIAGNOSIS — N40.1 BENIGN PROSTATIC HYPERPLASIA WITH LOWER URINARY TRACT SYMPTOMS, SYMPTOM DETAILS UNSPECIFIED: ICD-10-CM

## 2018-01-01 DIAGNOSIS — E78.2 MIXED HYPERLIPIDEMIA: ICD-10-CM

## 2018-01-01 DIAGNOSIS — S06.360A TRAUMATIC HEMORRHAGE OF CEREBRUM WITHOUT LOSS OF CONSCIOUSNESS, UNSPECIFIED LATERALITY, INITIAL ENCOUNTER (HCC): Primary | ICD-10-CM

## 2018-01-01 DIAGNOSIS — I48.91 ATRIAL FIBRILLATION, UNSPECIFIED TYPE (HCC): ICD-10-CM

## 2018-01-01 LAB
ABO + RH BLD: NORMAL
ABO + RH BLD: NORMAL
ABO GROUP BLD: NORMAL
ALBUMIN SERPL-MCNC: 3.6 G/DL (ref 3.5–5)
ALBUMIN SERPL-MCNC: 4.3 G/DL (ref 3.5–5)
ALBUMIN SERPL-MCNC: 4.4 G/DL (ref 3.5–5)
ALBUMIN SERPL-MCNC: 4.7 G/DL (ref 3.5–5)
ALBUMIN SERPL-MCNC: 4.9 G/DL (ref 3.5–5)
ALBUMIN SERPL-MCNC: 5 G/DL (ref 3.5–5)
ALBUMIN/GLOB SERPL: 1.2 G/DL (ref 1.1–2.5)
ALBUMIN/GLOB SERPL: 1.3 G/DL (ref 1.1–2.5)
ALBUMIN/GLOB SERPL: 1.4 G/DL (ref 1.1–2.5)
ALBUMIN/GLOB SERPL: 1.5 G/DL (ref 1.1–2.5)
ALP SERPL-CCNC: 48 U/L (ref 24–120)
ALP SERPL-CCNC: 56 U/L (ref 24–120)
ALP SERPL-CCNC: 65 U/L (ref 24–120)
ALP SERPL-CCNC: 67 U/L (ref 24–120)
ALP SERPL-CCNC: 71 U/L (ref 24–120)
ALP SERPL-CCNC: 71 U/L (ref 24–120)
ALT SERPL W P-5'-P-CCNC: 24 U/L (ref 0–54)
ALT SERPL W P-5'-P-CCNC: 25 U/L (ref 0–54)
ALT SERPL W P-5'-P-CCNC: 28 U/L (ref 0–54)
ALT SERPL W P-5'-P-CCNC: 28 U/L (ref 0–54)
ALT SERPL-CCNC: 27 U/L (ref 0–54)
ALT SERPL-CCNC: 35 U/L (ref 0–54)
ANION GAP SERPL CALCULATED.3IONS-SCNC: 10 MMOL/L (ref 4–13)
ANION GAP SERPL CALCULATED.3IONS-SCNC: 11 MMOL/L (ref 4–13)
ANION GAP SERPL CALCULATED.3IONS-SCNC: 11 MMOL/L (ref 4–13)
ANION GAP SERPL CALCULATED.3IONS-SCNC: 12 MMOL/L (ref 4–13)
AST SERPL-CCNC: 24 U/L (ref 7–45)
AST SERPL-CCNC: 32 U/L (ref 7–45)
AST SERPL-CCNC: 34 U/L (ref 7–45)
AST SERPL-CCNC: 34 U/L (ref 7–45)
AST SERPL-CCNC: 38 U/L (ref 7–45)
AST SERPL-CCNC: 39 U/L (ref 7–45)
BASOPHILS # BLD AUTO: 0 X10E3/UL (ref 0–0.2)
BASOPHILS # BLD AUTO: 0.04 10*3/MM3 (ref 0–0.2)
BASOPHILS # BLD AUTO: 0.05 10*3/MM3 (ref 0–0.2)
BASOPHILS NFR BLD AUTO: 0 %
BASOPHILS NFR BLD AUTO: 0.3 % (ref 0–2)
BASOPHILS NFR BLD AUTO: 0.4 % (ref 0–2)
BASOPHILS NFR BLD AUTO: 0.5 % (ref 0–2)
BASOPHILS NFR BLD AUTO: 0.5 % (ref 0–2)
BASOPHILS NFR BLD AUTO: 0.6 % (ref 0–2)
BH BB BLOOD EXPIRATION DATE: NORMAL
BH BB BLOOD EXPIRATION DATE: NORMAL
BH BB BLOOD TYPE BARCODE: 5100
BH BB BLOOD TYPE BARCODE: 9500
BH BB DISPENSE STATUS: NORMAL
BH BB DISPENSE STATUS: NORMAL
BH BB PRODUCT CODE: NORMAL
BH BB PRODUCT CODE: NORMAL
BH BB UNIT NUMBER: NORMAL
BH BB UNIT NUMBER: NORMAL
BILIRUB BLD-MCNC: NEGATIVE MG/DL
BILIRUB SERPL-MCNC: 0.7 MG/DL (ref 0.1–1)
BILIRUB SERPL-MCNC: 0.8 MG/DL (ref 0.1–1)
BILIRUB SERPL-MCNC: 0.8 MG/DL (ref 0.1–1)
BILIRUB SERPL-MCNC: 1 MG/DL (ref 0.1–1)
BILIRUB SERPL-MCNC: 1.1 MG/DL (ref 0.1–1)
BILIRUB SERPL-MCNC: 1.1 MG/DL (ref 0.1–1)
BILIRUB UR QL STRIP: NEGATIVE
BLD GP AB SCN SERPL QL: NEGATIVE
BUN BLD-MCNC: 18 MG/DL (ref 5–21)
BUN BLD-MCNC: 19 MG/DL (ref 5–21)
BUN BLD-MCNC: 21 MG/DL (ref 5–21)
BUN BLD-MCNC: 22 MG/DL (ref 5–21)
BUN SERPL-MCNC: 19 MG/DL (ref 5–21)
BUN SERPL-MCNC: 19 MG/DL (ref 5–21)
BUN/CREAT SERPL: 18 (ref 7–25)
BUN/CREAT SERPL: 20.7 (ref 7–25)
BUN/CREAT SERPL: 21 (ref 7–25)
BUN/CREAT SERPL: 21.3 (ref 7–25)
BUN/CREAT SERPL: 21.6 (ref 7–25)
BUN/CREAT SERPL: 24.1 (ref 7–25)
CALCIUM SERPL-MCNC: 10.1 MG/DL (ref 8.4–10.4)
CALCIUM SERPL-MCNC: 10.3 MG/DL (ref 8.4–10.4)
CALCIUM SPEC-SCNC: 8.9 MG/DL (ref 8.4–10.4)
CALCIUM SPEC-SCNC: 9.3 MG/DL (ref 8.4–10.4)
CALCIUM SPEC-SCNC: 9.5 MG/DL (ref 8.4–10.4)
CALCIUM SPEC-SCNC: 9.7 MG/DL (ref 8.4–10.4)
CEA SERPL-MCNC: 0.91 NG/ML (ref 0–5)
CEA SERPL-MCNC: 1.06 NG/ML (ref 0–5)
CHLORIDE SERPL-SCNC: 100 MMOL/L (ref 98–110)
CHLORIDE SERPL-SCNC: 100 MMOL/L (ref 98–110)
CHLORIDE SERPL-SCNC: 101 MMOL/L (ref 98–110)
CHLORIDE SERPL-SCNC: 102 MMOL/L (ref 98–110)
CHLORIDE SERPL-SCNC: 102 MMOL/L (ref 98–110)
CHLORIDE SERPL-SCNC: 98 MMOL/L (ref 98–110)
CHOLEST SERPL-MCNC: 151 MG/DL (ref 130–200)
CHOLEST SERPL-MCNC: 167 MG/DL (ref 130–200)
CLARITY UR: CLEAR
CLARITY, POC: CLEAR
CO2 SERPL-SCNC: 27 MMOL/L (ref 24–31)
CO2 SERPL-SCNC: 28 MMOL/L (ref 24–31)
CO2 SERPL-SCNC: 28 MMOL/L (ref 24–31)
CO2 SERPL-SCNC: 31 MMOL/L (ref 24–31)
CO2 SERPL-SCNC: 31 MMOL/L (ref 24–31)
CO2 SERPL-SCNC: 33 MMOL/L (ref 24–31)
COLOR UR: YELLOW
COLOR UR: YELLOW
CREAT BLD-MCNC: 0.79 MG/DL (ref 0.5–1.4)
CREAT BLD-MCNC: 0.97 MG/DL (ref 0.5–1.4)
CREAT BLD-MCNC: 1 MG/DL (ref 0.5–1.4)
CREAT BLD-MCNC: 1.05 MG/DL (ref 0.5–1.4)
CREAT SERPL-MCNC: 0.89 MG/DL (ref 0.5–1.4)
CREAT SERPL-MCNC: 0.92 MG/DL (ref 0.5–1.4)
D-LACTATE SERPL-SCNC: 1.5 MMOL/L (ref 0.5–2)
DEPRECATED RDW RBC AUTO: 45.8 FL (ref 40–54)
DEPRECATED RDW RBC AUTO: 46.1 FL (ref 40–54)
DEPRECATED RDW RBC AUTO: 46.4 FL (ref 40–54)
DEPRECATED RDW RBC AUTO: 47.9 FL (ref 40–54)
EOSINOPHIL # BLD AUTO: 0 10*3/MM3 (ref 0–0.7)
EOSINOPHIL # BLD AUTO: 0.06 10*3/MM3 (ref 0–0.7)
EOSINOPHIL # BLD AUTO: 0.06 10*3/MM3 (ref 0–0.7)
EOSINOPHIL # BLD AUTO: 0.08 10*3/MM3 (ref 0–0.7)
EOSINOPHIL # BLD AUTO: 0.1 10*3/MM3 (ref 0–0.7)
EOSINOPHIL # BLD AUTO: 0.1 X10E3/UL (ref 0–0.4)
EOSINOPHIL NFR BLD AUTO: 0 % (ref 0–4)
EOSINOPHIL NFR BLD AUTO: 0.6 % (ref 0–4)
EOSINOPHIL NFR BLD AUTO: 0.7 % (ref 0–4)
EOSINOPHIL NFR BLD AUTO: 0.9 % (ref 0–4)
EOSINOPHIL NFR BLD AUTO: 0.9 % (ref 0–4)
EOSINOPHIL NFR BLD AUTO: 1 %
ERYTHROCYTE [DISTWIDTH] IN BLOOD BY AUTOMATED COUNT: 13.4 % (ref 12–15)
ERYTHROCYTE [DISTWIDTH] IN BLOOD BY AUTOMATED COUNT: 13.5 % (ref 12–15)
ERYTHROCYTE [DISTWIDTH] IN BLOOD BY AUTOMATED COUNT: 13.5 % (ref 12–15)
ERYTHROCYTE [DISTWIDTH] IN BLOOD BY AUTOMATED COUNT: 13.6 % (ref 12–15)
ERYTHROCYTE [DISTWIDTH] IN BLOOD BY AUTOMATED COUNT: 14.1 % (ref 12.3–15.4)
ERYTHROCYTE [DISTWIDTH] IN BLOOD BY AUTOMATED COUNT: 14.5 % (ref 12–15)
GFR SERPL CREATININE-BSD FRML MDRD: 66 ML/MIN/1.73
GFR SERPL CREATININE-BSD FRML MDRD: 70 ML/MIN/1.73
GFR SERPL CREATININE-BSD FRML MDRD: 72 ML/MIN/1.73
GFR SERPL CREATININE-BSD FRML MDRD: 91 ML/MIN/1.73
GFR SERPLBLD CREATININE-BSD FMLA CKD-EPI: 80 ML/MIN/1.73
GFR SERPLBLD CREATININE-BSD FMLA CKD-EPI: 97 ML/MIN/1.73
GLOBULIN SER CALC-MCNC: 3.3 GM/DL
GLOBULIN SER CALC-MCNC: 3.4 GM/DL
GLOBULIN UR ELPH-MCNC: 2.7 GM/DL
GLOBULIN UR ELPH-MCNC: 3.3 GM/DL
GLOBULIN UR ELPH-MCNC: 3.5 GM/DL
GLOBULIN UR ELPH-MCNC: 3.6 GM/DL
GLUCOSE BLD-MCNC: 107 MG/DL (ref 70–100)
GLUCOSE BLD-MCNC: 125 MG/DL (ref 70–100)
GLUCOSE BLD-MCNC: 127 MG/DL (ref 70–100)
GLUCOSE BLD-MCNC: 136 MG/DL (ref 70–100)
GLUCOSE SERPL-MCNC: 102 MG/DL (ref 70–100)
GLUCOSE SERPL-MCNC: 110 MG/DL (ref 70–100)
GLUCOSE UR STRIP-MCNC: NEGATIVE MG/DL
GLUCOSE UR STRIP-MCNC: NEGATIVE MG/DL
HCT VFR BLD AUTO: 42.6 % (ref 40–52)
HCT VFR BLD AUTO: 45.1 % (ref 40–52)
HCT VFR BLD AUTO: 46 % (ref 40–52)
HCT VFR BLD AUTO: 46 % (ref 40–52)
HCT VFR BLD AUTO: 46.2 % (ref 40–52)
HCT VFR BLD AUTO: 47.9 % (ref 37.5–51)
HCT VFR BLD AUTO: 49.3 % (ref 40–52)
HDLC SERPL-MCNC: 55 MG/DL
HDLC SERPL-MCNC: 55 MG/DL
HGB BLD-MCNC: 14.3 G/DL (ref 14–18)
HGB BLD-MCNC: 15.3 G/DL (ref 14–18)
HGB BLD-MCNC: 15.5 G/DL (ref 14–18)
HGB BLD-MCNC: 15.7 G/DL (ref 14–18)
HGB BLD-MCNC: 15.9 G/DL (ref 14–18)
HGB BLD-MCNC: 16.1 G/DL (ref 14–18)
HGB BLD-MCNC: 16.2 G/DL (ref 13–17.7)
HGB UR QL STRIP.AUTO: NEGATIVE
HOLD SPECIMEN: NORMAL
IMM GRANULOCYTES # BLD: 0 X10E3/UL (ref 0–0.1)
IMM GRANULOCYTES # BLD: 0.05 10*3/MM3 (ref 0–0.03)
IMM GRANULOCYTES # BLD: 0.06 10*3/MM3 (ref 0–0.03)
IMM GRANULOCYTES # BLD: 0.06 10*3/MM3 (ref 0–0.03)
IMM GRANULOCYTES # BLD: 0.07 10*3/MM3 (ref 0–0.03)
IMM GRANULOCYTES # BLD: 0.16 10*3/MM3 (ref 0–0.03)
IMM GRANULOCYTES NFR BLD: 0 %
IMM GRANULOCYTES NFR BLD: 0.5 % (ref 0–5)
IMM GRANULOCYTES NFR BLD: 0.6 % (ref 0–5)
IMM GRANULOCYTES NFR BLD: 0.7 % (ref 0–5)
IMM GRANULOCYTES NFR BLD: 0.7 % (ref 0–5)
IMM GRANULOCYTES NFR BLD: 0.8 % (ref 0–5)
INR BLD: 2.2 (ref 0.88–1.18)
INR PPP: 1.22 (ref 0.91–1.09)
INR PPP: 1.24 (ref 0.91–1.09)
INR PPP: 1.33 (ref 0.91–1.09)
INR PPP: 1.83 (ref 0.91–1.09)
INR PPP: 2.27 (ref 0.91–1.09)
INR PPP: 2.86 (ref 0.91–1.09)
INTERNATIONAL NORMALIZATION RATIO, POC: 1.6
INTERNATIONAL NORMALIZATION RATIO, POC: 1.7
INTERNATIONAL NORMALIZATION RATIO, POC: 1.7
INTERNATIONAL NORMALIZATION RATIO, POC: 1.8
INTERNATIONAL NORMALIZATION RATIO, POC: 1.8
INTERNATIONAL NORMALIZATION RATIO, POC: 2
INTERNATIONAL NORMALIZATION RATIO, POC: 2.3
INTERNATIONAL NORMALIZATION RATIO, POC: 3.1
INTERNATIONAL NORMALIZATION RATIO, POC: 6.3
KETONES UR QL STRIP: NEGATIVE
KETONES UR QL: NEGATIVE
LDLC SERPL CALC-MCNC: 79 MG/DL (ref 0–99)
LDLC SERPL CALC-MCNC: 96 MG/DL (ref 0–99)
LEUKOCYTE EST, POC: NEGATIVE
LEUKOCYTE ESTERASE UR QL STRIP.AUTO: NEGATIVE
LIPASE SERPL-CCNC: 22 U/L (ref 23–203)
LYMPHOCYTES # BLD AUTO: 1.85 10*3/MM3 (ref 0.72–4.86)
LYMPHOCYTES # BLD AUTO: 2.79 10*3/MM3 (ref 0.72–4.86)
LYMPHOCYTES # BLD AUTO: 2.91 10*3/MM3 (ref 0.72–4.86)
LYMPHOCYTES # BLD AUTO: 3.1 10*3/MM3 (ref 0.72–4.86)
LYMPHOCYTES # BLD AUTO: 3.57 10*3/MM3 (ref 0.72–4.86)
LYMPHOCYTES # BLD AUTO: 3.7 X10E3/UL (ref 0.7–3.1)
LYMPHOCYTES NFR BLD AUTO: 29 % (ref 15–45)
LYMPHOCYTES NFR BLD AUTO: 31.2 % (ref 15–45)
LYMPHOCYTES NFR BLD AUTO: 33.3 % (ref 15–45)
LYMPHOCYTES NFR BLD AUTO: 34.7 % (ref 15–45)
LYMPHOCYTES NFR BLD AUTO: 39 %
LYMPHOCYTES NFR BLD AUTO: 9.3 % (ref 15–45)
MCH RBC QN AUTO: 31.6 PG (ref 28–32)
MCH RBC QN AUTO: 31.7 PG (ref 28–32)
MCH RBC QN AUTO: 31.9 PG (ref 28–32)
MCH RBC QN AUTO: 32.1 PG (ref 28–32)
MCH RBC QN AUTO: 32.2 PG (ref 28–32)
MCH RBC QN AUTO: 32.7 PG (ref 26.6–33)
MCHC RBC AUTO-ENTMCNC: 32.7 G/DL (ref 33–36)
MCHC RBC AUTO-ENTMCNC: 33.6 G/DL (ref 33–36)
MCHC RBC AUTO-ENTMCNC: 33.8 G/DL (ref 31.5–35.7)
MCHC RBC AUTO-ENTMCNC: 34.1 G/DL (ref 33–36)
MCHC RBC AUTO-ENTMCNC: 34.4 G/DL (ref 33–36)
MCHC RBC AUTO-ENTMCNC: 34.6 G/DL (ref 33–36)
MCV RBC AUTO: 92.2 FL (ref 82–95)
MCV RBC AUTO: 92.4 FL (ref 82–95)
MCV RBC AUTO: 94.1 FL (ref 82–95)
MCV RBC AUTO: 95.9 FL (ref 82–95)
MCV RBC AUTO: 96.9 FL (ref 82–95)
MCV RBC AUTO: 97 FL (ref 79–97)
MONOCYTES # BLD AUTO: 0.9 X10E3/UL (ref 0.1–0.9)
MONOCYTES # BLD AUTO: 0.98 10*3/MM3 (ref 0.19–1.3)
MONOCYTES # BLD AUTO: 1.03 10*3/MM3 (ref 0.19–1.3)
MONOCYTES # BLD AUTO: 1.09 10*3/MM3 (ref 0.19–1.3)
MONOCYTES # BLD AUTO: 1.09 10*3/MM3 (ref 0.19–1.3)
MONOCYTES # BLD AUTO: 1.94 10*3/MM3 (ref 0.19–1.3)
MONOCYTES NFR BLD AUTO: 10.2 % (ref 4–12)
MONOCYTES NFR BLD AUTO: 11 % (ref 4–12)
MONOCYTES NFR BLD AUTO: 11 % (ref 4–12)
MONOCYTES NFR BLD AUTO: 11.3 % (ref 4–12)
MONOCYTES NFR BLD AUTO: 9 %
MONOCYTES NFR BLD AUTO: 9.8 % (ref 4–12)
NEUTROPHILS # BLD AUTO: 15.89 10*3/MM3 (ref 1.87–8.4)
NEUTROPHILS # BLD AUTO: 4.68 10*3/MM3 (ref 1.87–8.4)
NEUTROPHILS # BLD AUTO: 4.8 X10E3/UL (ref 1.4–7)
NEUTROPHILS # BLD AUTO: 5.22 10*3/MM3 (ref 1.87–8.4)
NEUTROPHILS # BLD AUTO: 5.55 10*3/MM3 (ref 1.87–8.4)
NEUTROPHILS # BLD AUTO: 5.87 10*3/MM3 (ref 1.87–8.4)
NEUTROPHILS NFR BLD AUTO: 51 %
NEUTROPHILS NFR BLD AUTO: 52.3 % (ref 39–78)
NEUTROPHILS NFR BLD AUTO: 54.6 % (ref 39–78)
NEUTROPHILS NFR BLD AUTO: 55.9 % (ref 39–78)
NEUTROPHILS NFR BLD AUTO: 57.9 % (ref 39–78)
NEUTROPHILS NFR BLD AUTO: 79.8 % (ref 39–78)
NITRITE UR QL STRIP: NEGATIVE
NITRITE UR-MCNC: NEGATIVE MG/ML
NRBC BLD AUTO-RTO: 0 /100 WBC (ref 0–0)
NRBC BLD MANUAL-RTO: 0 /100 WBC (ref 0–0)
PH UR STRIP.AUTO: 7.5 [PH] (ref 5–8)
PH UR: 5 [PH] (ref 5–8)
PLATELET # BLD AUTO: 214 10*3/MM3 (ref 130–400)
PLATELET # BLD AUTO: 220 10*3/MM3 (ref 130–400)
PLATELET # BLD AUTO: 228 X10E3/UL (ref 150–379)
PLATELET # BLD AUTO: 236 10*3/MM3 (ref 130–400)
PLATELET # BLD AUTO: 239 10*3/MM3 (ref 130–400)
PLATELET # BLD AUTO: 263 10*3/MM3 (ref 130–400)
PMV BLD AUTO: 9.2 FL (ref 6–12)
PMV BLD AUTO: 9.7 FL (ref 6–12)
POTASSIUM BLD-SCNC: 3.8 MMOL/L (ref 3.5–5.3)
POTASSIUM BLD-SCNC: 3.9 MMOL/L (ref 3.5–5.3)
POTASSIUM BLD-SCNC: 4 MMOL/L (ref 3.5–5.3)
POTASSIUM BLD-SCNC: 4.1 MMOL/L (ref 3.5–5.3)
POTASSIUM SERPL-SCNC: 4.2 MMOL/L (ref 3.5–5.3)
POTASSIUM SERPL-SCNC: 4.4 MMOL/L (ref 3.5–5.3)
PROT SERPL-MCNC: 6.3 G/DL (ref 6.3–8.7)
PROT SERPL-MCNC: 7.6 G/DL (ref 6.3–8.7)
PROT SERPL-MCNC: 8 G/DL (ref 6.3–8.7)
PROT SERPL-MCNC: 8.2 G/DL (ref 6.3–8.7)
PROT SERPL-MCNC: 8.3 G/DL (ref 6.3–8.7)
PROT SERPL-MCNC: 8.3 G/DL (ref 6.3–8.7)
PROT UR QL STRIP: NEGATIVE
PROT UR STRIP-MCNC: NEGATIVE MG/DL
PROTHROMBIN TIME, POC: NORMAL
PROTHROMBIN TIME: 15.8 SECONDS (ref 11.9–14.6)
PROTHROMBIN TIME: 16 SECONDS (ref 11.9–14.6)
PROTHROMBIN TIME: 16.9 SECONDS (ref 11.9–14.6)
PROTHROMBIN TIME: 21.8 SECONDS (ref 11.9–14.6)
PROTHROMBIN TIME: 24.5 SEC (ref 12–14.6)
PROTHROMBIN TIME: 25.9 SECONDS (ref 11.9–14.6)
PROTHROMBIN TIME: 31.1 SECONDS (ref 11.9–14.6)
RBC # BLD AUTO: 4.44 10*6/MM3 (ref 4.8–5.9)
RBC # BLD AUTO: 4.89 10*6/MM3 (ref 4.8–5.9)
RBC # BLD AUTO: 4.89 10*6/MM3 (ref 4.8–5.9)
RBC # BLD AUTO: 4.96 X10E6/UL (ref 4.14–5.8)
RBC # BLD AUTO: 4.98 10*6/MM3 (ref 4.8–5.9)
RBC # BLD AUTO: 5.09 10*6/MM3 (ref 4.8–5.9)
RBC # UR STRIP: ABNORMAL /UL
RH BLD: NEGATIVE
SODIUM BLD-SCNC: 140 MMOL/L (ref 135–145)
SODIUM BLD-SCNC: 141 MMOL/L (ref 135–145)
SODIUM BLD-SCNC: 142 MMOL/L (ref 135–145)
SODIUM BLD-SCNC: 143 MMOL/L (ref 135–145)
SODIUM SERPL-SCNC: 143 MMOL/L (ref 135–145)
SODIUM SERPL-SCNC: 144 MMOL/L (ref 135–145)
SP GR UR STRIP: 1.01 (ref 1–1.03)
SP GR UR: 1.01 (ref 1–1.03)
T&S EXPIRATION DATE: NORMAL
TRIGL SERPL-MCNC: 79 MG/DL (ref 0–149)
TRIGL SERPL-MCNC: 83 MG/DL (ref 0–149)
TSH SERPL DL<=0.005 MIU/L-ACNC: 1.87 MIU/ML (ref 0.47–4.68)
UNIT  ABO: NORMAL
UNIT  ABO: NORMAL
UNIT  RH: NORMAL
UNIT  RH: NORMAL
UROBILINOGEN UR QL STRIP: NORMAL
UROBILINOGEN UR QL: NORMAL
VLDLC SERPL CALC-MCNC: 15.8 MG/DL
VLDLC SERPL CALC-MCNC: 16.6 MG/DL
WBC # BLD AUTO: 10.73 10*3/MM3 (ref 4.8–10.8)
WBC # BLD AUTO: 9.6 X10E3/UL (ref 3.4–10.8)
WBC NRBC COR # BLD: 19.89 10*3/MM3 (ref 4.8–10.8)
WBC NRBC COR # BLD: 8.93 10*3/MM3 (ref 4.8–10.8)
WBC NRBC COR # BLD: 9.34 10*3/MM3 (ref 4.8–10.8)
WBC NRBC COR # BLD: 9.61 10*3/MM3 (ref 4.8–10.8)

## 2018-01-01 PROCEDURE — 85610 PROTHROMBIN TIME: CPT | Performed by: NURSE PRACTITIONER

## 2018-01-01 PROCEDURE — 25010000002 LORAZEPAM PER 2 MG: Performed by: EMERGENCY MEDICINE

## 2018-01-01 PROCEDURE — 85610 PROTHROMBIN TIME: CPT | Performed by: NEUROLOGICAL SURGERY

## 2018-01-01 PROCEDURE — 1036F TOBACCO NON-USER: CPT | Performed by: CLINICAL NURSE SPECIALIST

## 2018-01-01 PROCEDURE — 85610 PROTHROMBIN TIME: CPT | Performed by: CLINICAL NURSE SPECIALIST

## 2018-01-01 PROCEDURE — 25010000003 LEVETIRACETAM IN NACL 0.75% 1000 MG/100ML SOLUTION: Performed by: EMERGENCY MEDICINE

## 2018-01-01 PROCEDURE — 99232 SBSQ HOSP IP/OBS MODERATE 35: CPT | Performed by: NURSE PRACTITIONER

## 2018-01-01 PROCEDURE — 90715 TDAP VACCINE 7 YRS/> IM: CPT | Performed by: NURSE PRACTITIONER

## 2018-01-01 PROCEDURE — 70450 CT HEAD/BRAIN W/O DYE: CPT

## 2018-01-01 PROCEDURE — 85610 PROTHROMBIN TIME: CPT | Performed by: INTERNAL MEDICINE

## 2018-01-01 PROCEDURE — 85025 COMPLETE CBC W/AUTO DIFF WBC: CPT | Performed by: INTERNAL MEDICINE

## 2018-01-01 PROCEDURE — 36430 TRANSFUSION BLD/BLD COMPNT: CPT

## 2018-01-01 PROCEDURE — G0439 PPPS, SUBSEQ VISIT: HCPCS | Performed by: NURSE PRACTITIONER

## 2018-01-01 PROCEDURE — 80053 COMPREHEN METABOLIC PANEL: CPT | Performed by: INTERNAL MEDICINE

## 2018-01-01 PROCEDURE — 99497 ADVNCD CARE PLAN 30 MIN: CPT | Performed by: CLINICAL NURSE SPECIALIST

## 2018-01-01 PROCEDURE — 36415 COLL VENOUS BLD VENIPUNCTURE: CPT | Performed by: INTERNAL MEDICINE

## 2018-01-01 PROCEDURE — 93288 INTERROG EVL PM/LDLS PM IP: CPT | Performed by: CLINICAL NURSE SPECIALIST

## 2018-01-01 PROCEDURE — 99498 ADVNCD CARE PLAN ADDL 30 MIN: CPT | Performed by: CLINICAL NURSE SPECIALIST

## 2018-01-01 PROCEDURE — 93294 REM INTERROG EVL PM/LDLS PM: CPT | Performed by: CLINICAL NURSE SPECIALIST

## 2018-01-01 PROCEDURE — 86850 RBC ANTIBODY SCREEN: CPT | Performed by: EMERGENCY MEDICINE

## 2018-01-01 PROCEDURE — 99214 OFFICE O/P EST MOD 30 MIN: CPT | Performed by: INTERNAL MEDICINE

## 2018-01-01 PROCEDURE — 36415 COLL VENOUS BLD VENIPUNCTURE: CPT

## 2018-01-01 PROCEDURE — 82378 CARCINOEMBRYONIC ANTIGEN: CPT | Performed by: INTERNAL MEDICINE

## 2018-01-01 PROCEDURE — 99213 OFFICE O/P EST LOW 20 MIN: CPT | Performed by: CLINICAL NURSE SPECIALIST

## 2018-01-01 PROCEDURE — 99213 OFFICE O/P EST LOW 20 MIN: CPT | Performed by: UROLOGY

## 2018-01-01 PROCEDURE — 99232 SBSQ HOSP IP/OBS MODERATE 35: CPT | Performed by: NEUROLOGICAL SURGERY

## 2018-01-01 PROCEDURE — 72170 X-RAY EXAM OF PELVIS: CPT

## 2018-01-01 PROCEDURE — 25010000002 LORAZEPAM PER 2 MG: Performed by: NEUROLOGICAL SURGERY

## 2018-01-01 PROCEDURE — 99222 1ST HOSP IP/OBS MODERATE 55: CPT | Performed by: CLINICAL NURSE SPECIALIST

## 2018-01-01 PROCEDURE — G0008 ADMIN INFLUENZA VIRUS VAC: HCPCS | Performed by: NURSE PRACTITIONER

## 2018-01-01 PROCEDURE — 25010000002 ONDANSETRON PER 1 MG: Performed by: NEUROLOGICAL SURGERY

## 2018-01-01 PROCEDURE — 25010000002 VITAMIN K1 PER 1 MG: Performed by: EMERGENCY MEDICINE

## 2018-01-01 PROCEDURE — 99223 1ST HOSP IP/OBS HIGH 75: CPT | Performed by: NEUROLOGICAL SURGERY

## 2018-01-01 PROCEDURE — 30233K1 TRANSFUSION OF NONAUTOLOGOUS FROZEN PLASMA INTO PERIPHERAL VEIN, PERCUTANEOUS APPROACH: ICD-10-PCS | Performed by: EMERGENCY MEDICINE

## 2018-01-01 PROCEDURE — 81003 URINALYSIS AUTO W/O SCOPE: CPT | Performed by: EMERGENCY MEDICINE

## 2018-01-01 PROCEDURE — 83690 ASSAY OF LIPASE: CPT | Performed by: EMERGENCY MEDICINE

## 2018-01-01 PROCEDURE — 99285 EMERGENCY DEPT VISIT HI MDM: CPT

## 2018-01-01 PROCEDURE — 85610 PROTHROMBIN TIME: CPT | Performed by: EMERGENCY MEDICINE

## 2018-01-01 PROCEDURE — 1101F PT FALLS ASSESS-DOCD LE1/YR: CPT | Performed by: CLINICAL NURSE SPECIALIST

## 2018-01-01 PROCEDURE — 85025 COMPLETE CBC W/AUTO DIFF WBC: CPT | Performed by: EMERGENCY MEDICINE

## 2018-01-01 PROCEDURE — G8484 FLU IMMUNIZE NO ADMIN: HCPCS | Performed by: CLINICAL NURSE SPECIALIST

## 2018-01-01 PROCEDURE — 86900 BLOOD TYPING SEROLOGIC ABO: CPT | Performed by: EMERGENCY MEDICINE

## 2018-01-01 PROCEDURE — 94799 UNLISTED PULMONARY SVC/PX: CPT

## 2018-01-01 PROCEDURE — P9017 PLASMA 1 DONOR FRZ W/IN 8 HR: HCPCS

## 2018-01-01 PROCEDURE — 1123F ACP DISCUSS/DSCN MKR DOCD: CPT | Performed by: CLINICAL NURSE SPECIALIST

## 2018-01-01 PROCEDURE — 99214 OFFICE O/P EST MOD 30 MIN: CPT | Performed by: CLINICAL NURSE SPECIALIST

## 2018-01-01 PROCEDURE — 90471 IMMUNIZATION ADMIN: CPT | Performed by: NURSE PRACTITIONER

## 2018-01-01 PROCEDURE — 80053 COMPREHEN METABOLIC PANEL: CPT | Performed by: EMERGENCY MEDICINE

## 2018-01-01 PROCEDURE — 4040F PNEUMOC VAC/ADMIN/RCVD: CPT | Performed by: CLINICAL NURSE SPECIALIST

## 2018-01-01 PROCEDURE — 99238 HOSP IP/OBS DSCHRG MGMT 30/<: CPT | Performed by: NURSE PRACTITIONER

## 2018-01-01 PROCEDURE — 25810000003 SODIUM CHLORIDE 0.9 % WITH KCL 20 MEQ 20-0.9 MEQ/L-% SOLUTION: Performed by: NEUROLOGICAL SURGERY

## 2018-01-01 PROCEDURE — 90662 IIV NO PRSV INCREASED AG IM: CPT | Performed by: NURSE PRACTITIONER

## 2018-01-01 PROCEDURE — G8598 ASA/ANTIPLAT THER USED: HCPCS | Performed by: CLINICAL NURSE SPECIALIST

## 2018-01-01 PROCEDURE — 36415 COLL VENOUS BLD VENIPUNCTURE: CPT | Performed by: EMERGENCY MEDICINE

## 2018-01-01 PROCEDURE — G8419 CALC BMI OUT NRM PARAM NOF/U: HCPCS | Performed by: CLINICAL NURSE SPECIALIST

## 2018-01-01 PROCEDURE — 99214 OFFICE O/P EST MOD 30 MIN: CPT | Performed by: NURSE PRACTITIONER

## 2018-01-01 PROCEDURE — 99233 SBSQ HOSP IP/OBS HIGH 50: CPT | Performed by: CLINICAL NURSE SPECIALIST

## 2018-01-01 PROCEDURE — 86901 BLOOD TYPING SEROLOGIC RH(D): CPT | Performed by: EMERGENCY MEDICINE

## 2018-01-01 PROCEDURE — 94760 N-INVAS EAR/PLS OXIMETRY 1: CPT

## 2018-01-01 PROCEDURE — 71045 X-RAY EXAM CHEST 1 VIEW: CPT

## 2018-01-01 PROCEDURE — 81001 URINALYSIS AUTO W/SCOPE: CPT | Performed by: UROLOGY

## 2018-01-01 PROCEDURE — G8427 DOCREV CUR MEDS BY ELIG CLIN: HCPCS | Performed by: CLINICAL NURSE SPECIALIST

## 2018-01-01 PROCEDURE — 72125 CT NECK SPINE W/O DYE: CPT

## 2018-01-01 PROCEDURE — 86927 PLASMA FRESH FROZEN: CPT

## 2018-01-01 PROCEDURE — 83605 ASSAY OF LACTIC ACID: CPT | Performed by: EMERGENCY MEDICINE

## 2018-01-01 PROCEDURE — 99232 SBSQ HOSP IP/OBS MODERATE 35: CPT | Performed by: CLINICAL NURSE SPECIALIST

## 2018-01-01 PROCEDURE — 25010000002 LORAZEPAM PER 2 MG: Performed by: INTERNAL MEDICINE

## 2018-01-01 PROCEDURE — 93296 REM INTERROG EVL PM/IDS: CPT | Performed by: CLINICAL NURSE SPECIALIST

## 2018-01-01 RX ORDER — LEVOTHYROXINE SODIUM 0.1 MG/1
100 TABLET ORAL DAILY
Status: DISCONTINUED | OUTPATIENT
Start: 2018-01-01 | End: 2018-01-01

## 2018-01-01 RX ORDER — LORAZEPAM 2 MG/ML
0.5 INJECTION INTRAMUSCULAR
Status: DISCONTINUED | OUTPATIENT
Start: 2018-01-01 | End: 2018-01-01 | Stop reason: HOSPADM

## 2018-01-01 RX ORDER — DUTASTERIDE 0.5 MG/1
0.5 CAPSULE, LIQUID FILLED ORAL DAILY
Qty: 90 CAPSULE | Refills: 1 | Status: SHIPPED | OUTPATIENT
Start: 2018-01-01 | End: 2018-01-01 | Stop reason: SDUPTHER

## 2018-01-01 RX ORDER — LEVOTHYROXINE SODIUM 0.1 MG/1
100 TABLET ORAL DAILY
Qty: 90 TABLET | Refills: 1 | Status: SHIPPED | OUTPATIENT
Start: 2018-01-01 | End: 2018-01-01 | Stop reason: SDUPTHER

## 2018-01-01 RX ORDER — LEVOTHYROXINE SODIUM 0.1 MG/1
TABLET ORAL
Qty: 90 TABLET | Refills: 1 | OUTPATIENT
Start: 2018-01-01

## 2018-01-01 RX ORDER — WARFARIN SODIUM 5 MG/1
5 TABLET ORAL
Qty: 90 TABLET | Refills: 1 | Status: SHIPPED | OUTPATIENT
Start: 2018-01-01 | End: 2018-01-01 | Stop reason: SDUPTHER

## 2018-01-01 RX ORDER — WARFARIN SODIUM 7.5 MG/1
7.5 TABLET ORAL EVERY OTHER DAY
COMMUNITY
End: 2018-01-01 | Stop reason: HOSPADM

## 2018-01-01 RX ORDER — LORAZEPAM 2 MG/ML
2 CONCENTRATE ORAL
Status: DISCONTINUED | OUTPATIENT
Start: 2018-01-01 | End: 2018-01-01 | Stop reason: HOSPADM

## 2018-01-01 RX ORDER — MORPHINE SULFATE 20 MG/ML
10 SOLUTION ORAL EVERY 4 HOURS
Status: DISCONTINUED | OUTPATIENT
Start: 2018-01-01 | End: 2018-01-01 | Stop reason: HOSPADM

## 2018-01-01 RX ORDER — LORAZEPAM 1 MG/1
2 TABLET ORAL
Status: DISCONTINUED | OUTPATIENT
Start: 2018-01-01 | End: 2018-01-01 | Stop reason: HOSPADM

## 2018-01-01 RX ORDER — AMLODIPINE BESYLATE 10 MG/1
10 TABLET ORAL DAILY
Qty: 90 TABLET | Refills: 1 | Status: SHIPPED | OUTPATIENT
Start: 2018-01-01 | End: 2018-01-01 | Stop reason: SDUPTHER

## 2018-01-01 RX ORDER — DIPHENOXYLATE HYDROCHLORIDE AND ATROPINE SULFATE 2.5; .025 MG/1; MG/1
1 TABLET ORAL
Status: DISCONTINUED | OUTPATIENT
Start: 2018-01-01 | End: 2018-01-01 | Stop reason: HOSPADM

## 2018-01-01 RX ORDER — SCOLOPAMINE TRANSDERMAL SYSTEM 1 MG/1
1 PATCH, EXTENDED RELEASE TRANSDERMAL
Status: DISCONTINUED | OUTPATIENT
Start: 2018-01-01 | End: 2018-01-01

## 2018-01-01 RX ORDER — LORAZEPAM 2 MG/ML
1 INJECTION INTRAMUSCULAR ONCE
Status: COMPLETED | OUTPATIENT
Start: 2018-01-01 | End: 2018-01-01

## 2018-01-01 RX ORDER — DUTASTERIDE 0.5 MG/1
0.5 CAPSULE, LIQUID FILLED ORAL DAILY
Qty: 90 CAPSULE | Refills: 1 | Status: SHIPPED | OUTPATIENT
Start: 2018-01-01 | End: 2018-01-01 | Stop reason: HOSPADM

## 2018-01-01 RX ORDER — ONDANSETRON 4 MG/1
4 TABLET, FILM COATED ORAL EVERY 6 HOURS PRN
Status: DISCONTINUED | OUTPATIENT
Start: 2018-01-01 | End: 2018-01-01 | Stop reason: HOSPADM

## 2018-01-01 RX ORDER — MORPHINE SULFATE 20 MG/ML
10 SOLUTION ORAL
Status: DISCONTINUED | OUTPATIENT
Start: 2018-01-01 | End: 2018-01-01 | Stop reason: HOSPADM

## 2018-01-01 RX ORDER — AMLODIPINE BESYLATE 10 MG/1
10 TABLET ORAL DAILY
Status: DISCONTINUED | OUTPATIENT
Start: 2018-01-01 | End: 2018-01-01

## 2018-01-01 RX ORDER — AMLODIPINE BESYLATE 10 MG/1
10 TABLET ORAL DAILY
Qty: 90 TABLET | Refills: 1 | Status: SHIPPED | OUTPATIENT
Start: 2018-01-01 | End: 2018-01-01 | Stop reason: HOSPADM

## 2018-01-01 RX ORDER — FINASTERIDE 5 MG/1
5 TABLET, FILM COATED ORAL DAILY
Status: DISCONTINUED | OUTPATIENT
Start: 2018-01-01 | End: 2018-01-01 | Stop reason: SDUPTHER

## 2018-01-01 RX ORDER — LORAZEPAM 2 MG/ML
1 CONCENTRATE ORAL
Qty: 30 ML | Refills: 0 | Status: SHIPPED | OUTPATIENT
Start: 2018-01-01 | End: 2018-01-01

## 2018-01-01 RX ORDER — OXYBUTYNIN CHLORIDE 5 MG/1
5 TABLET, EXTENDED RELEASE ORAL NIGHTLY
Qty: 90 TABLET | Refills: 1 | Status: SHIPPED | OUTPATIENT
Start: 2018-01-01 | End: 2018-01-01 | Stop reason: SDUPTHER

## 2018-01-01 RX ORDER — LORAZEPAM 2 MG/ML
2 INJECTION INTRAMUSCULAR
Status: DISCONTINUED | OUTPATIENT
Start: 2018-01-01 | End: 2018-01-01 | Stop reason: HOSPADM

## 2018-01-01 RX ORDER — LORAZEPAM 2 MG/ML
1 INJECTION INTRAMUSCULAR
Status: DISCONTINUED | OUTPATIENT
Start: 2018-01-01 | End: 2018-01-01 | Stop reason: HOSPADM

## 2018-01-01 RX ORDER — LORAZEPAM 1 MG/1
1 TABLET ORAL
Status: DISCONTINUED | OUTPATIENT
Start: 2018-01-01 | End: 2018-01-01 | Stop reason: HOSPADM

## 2018-01-01 RX ORDER — SENNA AND DOCUSATE SODIUM 50; 8.6 MG/1; MG/1
2 TABLET, FILM COATED ORAL 2 TIMES DAILY
Status: DISCONTINUED | OUTPATIENT
Start: 2018-01-01 | End: 2018-01-01

## 2018-01-01 RX ORDER — ONDANSETRON 2 MG/ML
4 INJECTION INTRAMUSCULAR; INTRAVENOUS ONCE
Status: COMPLETED | OUTPATIENT
Start: 2018-01-01 | End: 2018-01-01

## 2018-01-01 RX ORDER — METOPROLOL TARTRATE 5 MG/5ML
5 INJECTION INTRAVENOUS EVERY 6 HOURS PRN
Status: DISCONTINUED | OUTPATIENT
Start: 2018-01-01 | End: 2018-01-01 | Stop reason: HOSPADM

## 2018-01-01 RX ORDER — SCOLOPAMINE TRANSDERMAL SYSTEM 1 MG/1
1 PATCH, EXTENDED RELEASE TRANSDERMAL
Status: DISCONTINUED | OUTPATIENT
Start: 2018-01-01 | End: 2018-01-01 | Stop reason: HOSPADM

## 2018-01-01 RX ORDER — METOPROLOL TARTRATE 50 MG/1
50 TABLET, FILM COATED ORAL EVERY 12 HOURS SCHEDULED
Status: DISCONTINUED | OUTPATIENT
Start: 2018-01-01 | End: 2018-01-01

## 2018-01-01 RX ORDER — AMLODIPINE BESYLATE 10 MG/1
10 TABLET ORAL DAILY
Qty: 10 TABLET | Refills: 0 | Status: SHIPPED | OUTPATIENT
Start: 2018-01-01 | End: 2018-01-01 | Stop reason: SDUPTHER

## 2018-01-01 RX ORDER — ONDANSETRON 2 MG/ML
4 INJECTION INTRAMUSCULAR; INTRAVENOUS EVERY 6 HOURS PRN
Status: DISCONTINUED | OUTPATIENT
Start: 2018-01-01 | End: 2018-01-01 | Stop reason: HOSPADM

## 2018-01-01 RX ORDER — LORAZEPAM 2 MG/ML
0.5 CONCENTRATE ORAL
Status: DISCONTINUED | OUTPATIENT
Start: 2018-01-01 | End: 2018-01-01 | Stop reason: HOSPADM

## 2018-01-01 RX ORDER — ACETAMINOPHEN 325 MG/1
650 TABLET ORAL EVERY 4 HOURS PRN
Status: DISCONTINUED | OUTPATIENT
Start: 2018-01-01 | End: 2018-01-01 | Stop reason: HOSPADM

## 2018-01-01 RX ORDER — SCOLOPAMINE TRANSDERMAL SYSTEM 1 MG/1
1 PATCH, EXTENDED RELEASE TRANSDERMAL
Qty: 3 PATCH | Refills: 1 | Status: SHIPPED | OUTPATIENT
Start: 2018-01-01 | End: 2018-01-01

## 2018-01-01 RX ORDER — METOPROLOL TARTRATE 50 MG/1
TABLET, FILM COATED ORAL
Qty: 90 TABLET | Refills: 1 | Status: SHIPPED | OUTPATIENT
Start: 2018-01-01 | End: 2018-01-01 | Stop reason: SDUPTHER

## 2018-01-01 RX ORDER — OXYBUTYNIN CHLORIDE 5 MG/1
5 TABLET, EXTENDED RELEASE ORAL NIGHTLY
Qty: 90 TABLET | Refills: 1 | Status: SHIPPED | OUTPATIENT
Start: 2018-01-01 | End: 2018-01-01 | Stop reason: HOSPADM

## 2018-01-01 RX ORDER — ONDANSETRON 4 MG/1
4 TABLET, ORALLY DISINTEGRATING ORAL EVERY 6 HOURS PRN
Status: DISCONTINUED | OUTPATIENT
Start: 2018-01-01 | End: 2018-01-01 | Stop reason: HOSPADM

## 2018-01-01 RX ORDER — SODIUM CHLORIDE 0.9 % (FLUSH) 0.9 %
3-10 SYRINGE (ML) INJECTION AS NEEDED
Status: DISCONTINUED | OUTPATIENT
Start: 2018-01-01 | End: 2018-01-01 | Stop reason: HOSPADM

## 2018-01-01 RX ORDER — LORAZEPAM 2 MG/ML
0.5 INJECTION INTRAMUSCULAR ONCE
Status: COMPLETED | OUTPATIENT
Start: 2018-01-01 | End: 2018-01-01

## 2018-01-01 RX ORDER — LEVOTHYROXINE SODIUM 0.1 MG/1
100 TABLET ORAL DAILY
Qty: 90 TABLET | Refills: 1 | Status: SHIPPED | OUTPATIENT
Start: 2018-01-01 | End: 2018-01-01 | Stop reason: HOSPADM

## 2018-01-01 RX ORDER — FERROUS SULFATE 325(65) MG
325 TABLET ORAL
Qty: 90 TABLET | Refills: 1 | Status: SHIPPED | OUTPATIENT
Start: 2018-01-01 | End: 2018-01-01 | Stop reason: SDUPTHER

## 2018-01-01 RX ORDER — METOPROLOL TARTRATE 50 MG/1
TABLET, FILM COATED ORAL
Qty: 90 TABLET | Refills: 1 | OUTPATIENT
Start: 2018-01-01

## 2018-01-01 RX ORDER — SODIUM CHLORIDE AND POTASSIUM CHLORIDE 150; 900 MG/100ML; MG/100ML
100 INJECTION, SOLUTION INTRAVENOUS CONTINUOUS
Status: DISCONTINUED | OUTPATIENT
Start: 2018-01-01 | End: 2018-01-01 | Stop reason: HOSPADM

## 2018-01-01 RX ORDER — MORPHINE SULFATE 20 MG/ML
10 SOLUTION ORAL
Qty: 30 ML | Refills: 0 | Status: SHIPPED | OUTPATIENT
Start: 2018-01-01 | End: 2018-12-08

## 2018-01-01 RX ORDER — LORAZEPAM 0.5 MG/1
0.5 TABLET ORAL
Status: DISCONTINUED | OUTPATIENT
Start: 2018-01-01 | End: 2018-01-01 | Stop reason: HOSPADM

## 2018-01-01 RX ORDER — LEVETIRACETAM 10 MG/ML
1000 INJECTION INTRAVASCULAR ONCE
Status: COMPLETED | OUTPATIENT
Start: 2018-01-01 | End: 2018-01-01

## 2018-01-01 RX ORDER — HALOPERIDOL 5 MG/ML
1 INJECTION INTRAMUSCULAR EVERY 4 HOURS PRN
Status: DISCONTINUED | OUTPATIENT
Start: 2018-01-01 | End: 2018-01-01 | Stop reason: HOSPADM

## 2018-01-01 RX ORDER — FINASTERIDE 5 MG/1
5 TABLET, FILM COATED ORAL DAILY
Qty: 20 TABLET | Refills: 0 | Status: SHIPPED | OUTPATIENT
Start: 2018-01-01 | End: 2018-01-01 | Stop reason: HOSPADM

## 2018-01-01 RX ORDER — FINASTERIDE 5 MG/1
5 TABLET, FILM COATED ORAL DAILY
Qty: 20 TABLET | Refills: 0 | Status: SHIPPED | OUTPATIENT
Start: 2018-01-01 | End: 2018-01-01 | Stop reason: SDUPTHER

## 2018-01-01 RX ORDER — ALENDRONATE SODIUM 70 MG/1
70 TABLET ORAL
Qty: 12 TABLET | Refills: 2 | Status: SHIPPED | OUTPATIENT
Start: 2018-01-01 | End: 2018-01-01 | Stop reason: SDUPTHER

## 2018-01-01 RX ORDER — FERROUS SULFATE 325(65) MG
325 TABLET ORAL
Qty: 90 TABLET | Refills: 1 | Status: SHIPPED | OUTPATIENT
Start: 2018-01-01 | End: 2018-01-01 | Stop reason: HOSPADM

## 2018-01-01 RX ORDER — LORAZEPAM 2 MG/ML
1 CONCENTRATE ORAL
Status: DISCONTINUED | OUTPATIENT
Start: 2018-01-01 | End: 2018-01-01 | Stop reason: HOSPADM

## 2018-01-01 RX ORDER — FENTANYL 25 UG/H
1 PATCH TRANSDERMAL
Status: DISCONTINUED | OUTPATIENT
Start: 2018-01-01 | End: 2018-01-01 | Stop reason: HOSPADM

## 2018-01-01 RX ORDER — WARFARIN SODIUM 5 MG/1
5 TABLET ORAL
Qty: 90 TABLET | Refills: 1 | Status: SHIPPED | OUTPATIENT
Start: 2018-01-01 | End: 2018-01-01 | Stop reason: HOSPADM

## 2018-01-01 RX ORDER — LORAZEPAM 2 MG/ML
0.5 INJECTION INTRAMUSCULAR EVERY 6 HOURS PRN
Status: DISCONTINUED | OUTPATIENT
Start: 2018-01-01 | End: 2018-01-01

## 2018-01-01 RX ORDER — ACETAMINOPHEN 650 MG/1
650 SUPPOSITORY RECTAL EVERY 4 HOURS PRN
Status: DISCONTINUED | OUTPATIENT
Start: 2018-01-01 | End: 2018-01-01 | Stop reason: HOSPADM

## 2018-01-01 RX ORDER — ALENDRONATE SODIUM 70 MG/1
70 TABLET ORAL
Qty: 12 TABLET | Refills: 2 | Status: SHIPPED | OUTPATIENT
Start: 2018-01-01 | End: 2018-01-01 | Stop reason: HOSPADM

## 2018-01-01 RX ORDER — DIPHENHYDRAMINE HYDROCHLORIDE 50 MG/ML
25 INJECTION INTRAMUSCULAR; INTRAVENOUS EVERY 6 HOURS PRN
Status: DISCONTINUED | OUTPATIENT
Start: 2018-01-01 | End: 2018-01-01 | Stop reason: HOSPADM

## 2018-01-01 RX ORDER — HALOPERIDOL 2 MG/ML
1 SOLUTION ORAL EVERY 4 HOURS PRN
Status: DISCONTINUED | OUTPATIENT
Start: 2018-01-01 | End: 2018-01-01 | Stop reason: HOSPADM

## 2018-01-01 RX ORDER — DIPHENHYDRAMINE HCL 25 MG
25 CAPSULE ORAL EVERY 6 HOURS PRN
Status: DISCONTINUED | OUTPATIENT
Start: 2018-01-01 | End: 2018-01-01 | Stop reason: HOSPADM

## 2018-01-01 RX ORDER — HYDROMORPHONE HCL 110MG/55ML
1.5 PATIENT CONTROLLED ANALGESIA SYRINGE INTRAVENOUS
Status: DISCONTINUED | OUTPATIENT
Start: 2018-01-01 | End: 2018-01-01

## 2018-01-01 RX ORDER — FINASTERIDE 5 MG/1
5 TABLET, FILM COATED ORAL DAILY
Status: DISCONTINUED | OUTPATIENT
Start: 2018-01-01 | End: 2018-01-01

## 2018-01-01 RX ORDER — OXYBUTYNIN CHLORIDE 5 MG/1
5 TABLET, EXTENDED RELEASE ORAL NIGHTLY
Status: DISCONTINUED | OUTPATIENT
Start: 2018-01-01 | End: 2018-01-01

## 2018-01-01 RX ORDER — SCOLOPAMINE TRANSDERMAL SYSTEM 1 MG/1
1 PATCH, EXTENDED RELEASE TRANSDERMAL
Status: DISCONTINUED | OUTPATIENT
Start: 2018-01-01 | End: 2018-01-01 | Stop reason: SDUPTHER

## 2018-01-01 RX ORDER — HALOPERIDOL 1 MG/1
1 TABLET ORAL EVERY 8 HOURS PRN
Status: DISCONTINUED | OUTPATIENT
Start: 2018-01-01 | End: 2018-01-01 | Stop reason: HOSPADM

## 2018-01-01 RX ORDER — SODIUM CHLORIDE 0.9 % (FLUSH) 0.9 %
3 SYRINGE (ML) INJECTION EVERY 12 HOURS SCHEDULED
Status: DISCONTINUED | OUTPATIENT
Start: 2018-01-01 | End: 2018-01-01

## 2018-01-01 RX ORDER — FENTANYL 25 UG/H
1 PATCH TRANSDERMAL
Qty: 5 EACH | Refills: 0 | Status: SHIPPED | OUTPATIENT
Start: 2018-01-01

## 2018-01-01 RX ORDER — FENTANYL 25 UG/H
1 PATCH TRANSDERMAL
Qty: 5 EACH | Refills: 0 | Status: SHIPPED | OUTPATIENT
Start: 2018-01-01 | End: 2018-01-01 | Stop reason: SDUPTHER

## 2018-01-01 RX ORDER — LORAZEPAM 2 MG/ML
1 CONCENTRATE ORAL EVERY 8 HOURS SCHEDULED
Status: DISCONTINUED | OUTPATIENT
Start: 2018-01-01 | End: 2018-01-01 | Stop reason: HOSPADM

## 2018-01-01 RX ORDER — METOPROLOL TARTRATE 50 MG/1
TABLET, FILM COATED ORAL
Qty: 10 TABLET | Refills: 0 | Status: SHIPPED | OUTPATIENT
Start: 2018-01-01 | End: 2018-01-01 | Stop reason: SDUPTHER

## 2018-01-01 RX ORDER — OXYBUTYNIN CHLORIDE 5 MG/1
5 TABLET, EXTENDED RELEASE ORAL NIGHTLY
COMMUNITY
End: 2018-01-01 | Stop reason: SDUPTHER

## 2018-01-01 RX ORDER — SCOLOPAMINE TRANSDERMAL SYSTEM 1 MG/1
1 PATCH, EXTENDED RELEASE TRANSDERMAL
Qty: 3 PATCH | Refills: 1 | Status: SHIPPED | OUTPATIENT
Start: 2018-01-01

## 2018-01-01 RX ORDER — MORPHINE SULFATE 20 MG/ML
5 SOLUTION ORAL EVERY 4 HOURS
Status: DISCONTINUED | OUTPATIENT
Start: 2018-01-01 | End: 2018-01-01

## 2018-01-01 RX ORDER — WARFARIN SODIUM 5 MG/1
TABLET ORAL
Qty: 180 TABLET | Refills: 3 | Status: SHIPPED | OUTPATIENT
Start: 2018-01-01

## 2018-01-01 RX ORDER — METOPROLOL TARTRATE 50 MG/1
TABLET, FILM COATED ORAL
Qty: 90 TABLET | Refills: 1 | Status: ON HOLD | OUTPATIENT
Start: 2018-01-01 | End: 2018-01-01

## 2018-01-01 RX ORDER — LORAZEPAM 2 MG/ML
0.5 CONCENTRATE ORAL EVERY 8 HOURS SCHEDULED
Status: DISCONTINUED | OUTPATIENT
Start: 2018-01-01 | End: 2018-01-01

## 2018-01-01 RX ORDER — LORAZEPAM 2 MG/ML
1 CONCENTRATE ORAL
Qty: 30 ML | Refills: 0 | Status: SHIPPED | OUTPATIENT
Start: 2018-01-01 | End: 2018-12-08

## 2018-01-01 RX ADMIN — FENTANYL 1 PATCH: 25 PATCH TRANSDERMAL at 11:28

## 2018-01-01 RX ADMIN — LORAZEPAM 1 MG: 2 SOLUTION, CONCENTRATE ORAL at 14:02

## 2018-01-01 RX ADMIN — POTASSIUM CHLORIDE AND SODIUM CHLORIDE 100 ML/HR: 900; 150 INJECTION, SOLUTION INTRAVENOUS at 23:46

## 2018-01-01 RX ADMIN — MORPHINE SULFATE 10 MG: 100 SOLUTION ORAL at 00:52

## 2018-01-01 RX ADMIN — ACETAMINOPHEN 650 MG: 650 SUPPOSITORY RECTAL at 14:09

## 2018-01-01 RX ADMIN — MORPHINE SULFATE 10 MG: 100 SOLUTION ORAL at 12:57

## 2018-01-01 RX ADMIN — SCOPOLAMINE 1 PATCH: 1 PATCH, EXTENDED RELEASE TRANSDERMAL at 05:14

## 2018-01-01 RX ADMIN — LORAZEPAM 1 MG: 2 SOLUTION, CONCENTRATE ORAL at 22:53

## 2018-01-01 RX ADMIN — LORAZEPAM 1 MG: 2 SOLUTION, CONCENTRATE ORAL at 14:50

## 2018-01-01 RX ADMIN — LORAZEPAM 2 MG: 2 INJECTION INTRAMUSCULAR; INTRAVENOUS at 17:01

## 2018-01-01 RX ADMIN — SODIUM CHLORIDE 5 MG/HR: 9 INJECTION, SOLUTION INTRAVENOUS at 21:38

## 2018-01-01 RX ADMIN — ACETAMINOPHEN 650 MG: 650 SUPPOSITORY RECTAL at 21:36

## 2018-01-01 RX ADMIN — SODIUM CHLORIDE, PRESERVATIVE FREE 3 ML: 5 INJECTION INTRAVENOUS at 20:50

## 2018-01-01 RX ADMIN — LORAZEPAM 0.5 MG: 2 INJECTION INTRAMUSCULAR; INTRAVENOUS at 20:46

## 2018-01-01 RX ADMIN — ONDANSETRON HYDROCHLORIDE 4 MG: 2 INJECTION INTRAMUSCULAR; INTRAVENOUS at 22:39

## 2018-01-01 RX ADMIN — SODIUM CHLORIDE, PRESERVATIVE FREE 3 ML: 5 INJECTION INTRAVENOUS at 09:00

## 2018-01-01 RX ADMIN — SODIUM CHLORIDE, PRESERVATIVE FREE 3 ML: 5 INJECTION INTRAVENOUS at 09:32

## 2018-01-01 RX ADMIN — LORAZEPAM 1 MG: 2 INJECTION INTRAMUSCULAR; INTRAVENOUS at 22:39

## 2018-01-01 RX ADMIN — MORPHINE SULFATE 5 MG: 100 SOLUTION ORAL at 09:30

## 2018-01-01 RX ADMIN — POTASSIUM CHLORIDE AND SODIUM CHLORIDE 100 ML/HR: 900; 150 INJECTION, SOLUTION INTRAVENOUS at 12:15

## 2018-01-01 RX ADMIN — MORPHINE SULFATE 10 MG: 100 SOLUTION ORAL at 14:52

## 2018-01-01 RX ADMIN — MORPHINE SULFATE 10 MG: 100 SOLUTION ORAL at 13:00

## 2018-01-01 RX ADMIN — LORAZEPAM 2 MG: 2 INJECTION INTRAMUSCULAR; INTRAVENOUS at 13:21

## 2018-01-01 RX ADMIN — LORAZEPAM 0.5 MG: 2 INJECTION INTRAMUSCULAR; INTRAVENOUS at 02:52

## 2018-01-01 RX ADMIN — LEVETIRACETAM 1000 MG: 10 INJECTION INTRAVENOUS at 21:48

## 2018-01-01 RX ADMIN — SODIUM CHLORIDE, PRESERVATIVE FREE 3 ML: 5 INJECTION INTRAVENOUS at 02:54

## 2018-01-01 RX ADMIN — PHYTONADIONE 10 MG: 10 INJECTION, EMULSION INTRAMUSCULAR; INTRAVENOUS; SUBCUTANEOUS at 22:02

## 2018-01-01 RX ADMIN — ACETAMINOPHEN 650 MG: 650 SUPPOSITORY RECTAL at 20:46

## 2018-01-01 RX ADMIN — MORPHINE SULFATE 5 MG: 100 SOLUTION ORAL at 05:30

## 2018-01-01 RX ADMIN — LORAZEPAM 0.5 MG: 2 INJECTION INTRAMUSCULAR; INTRAVENOUS at 15:45

## 2018-01-01 RX ADMIN — MORPHINE SULFATE 5 MG: 100 SOLUTION ORAL at 01:56

## 2018-01-01 RX ADMIN — LORAZEPAM 1 MG: 2 SOLUTION, CONCENTRATE ORAL at 06:36

## 2018-01-01 RX ADMIN — SCOPALAMINE 1 PATCH: 1 PATCH, EXTENDED RELEASE TRANSDERMAL at 11:29

## 2018-01-01 RX ADMIN — LORAZEPAM 2 MG: 2 INJECTION INTRAMUSCULAR; INTRAVENOUS at 02:01

## 2018-01-01 RX ADMIN — MORPHINE SULFATE 10 MG: 100 SOLUTION ORAL at 16:27

## 2018-01-01 RX ADMIN — MORPHINE SULFATE 10 MG: 100 SOLUTION ORAL at 15:59

## 2018-01-01 RX ADMIN — MORPHINE SULFATE 10 MG: 100 SOLUTION ORAL at 08:33

## 2018-01-01 RX ADMIN — MORPHINE SULFATE 10 MG: 100 SOLUTION ORAL at 20:48

## 2018-01-01 RX ADMIN — MORPHINE SULFATE 10 MG: 100 SOLUTION ORAL at 04:46

## 2018-01-01 RX ADMIN — MORPHINE SULFATE 5 MG: 100 SOLUTION ORAL at 22:01

## 2018-01-01 RX ADMIN — SODIUM CHLORIDE, PRESERVATIVE FREE 3 ML: 5 INJECTION INTRAVENOUS at 22:00

## 2018-01-01 RX ADMIN — LORAZEPAM 2 MG: 2 INJECTION INTRAMUSCULAR; INTRAVENOUS at 21:36

## 2018-01-01 RX ADMIN — ACETAMINOPHEN 650 MG: 650 SUPPOSITORY RECTAL at 22:35

## 2018-01-01 RX ADMIN — MORPHINE SULFATE 5 MG: 100 SOLUTION ORAL at 17:34

## 2018-01-01 RX ADMIN — POTASSIUM CHLORIDE AND SODIUM CHLORIDE 100 ML/HR: 900; 150 INJECTION, SOLUTION INTRAVENOUS at 00:37

## 2018-01-01 RX ADMIN — HYDROMORPHONE HYDROCHLORIDE 1 MG: 1 INJECTION, SOLUTION INTRAMUSCULAR; INTRAVENOUS; SUBCUTANEOUS at 03:50

## 2018-01-01 RX ADMIN — LORAZEPAM 0.5 MG: 2 INJECTION INTRAMUSCULAR; INTRAVENOUS at 03:25

## 2018-01-01 RX ADMIN — LORAZEPAM 0.5 MG: 2 INJECTION INTRAMUSCULAR; INTRAVENOUS at 12:14

## 2018-01-08 NOTE — TELEPHONE ENCOUNTER
I have called the patient and notified him that patient is due to have labs and chronic office visit and to notify us if the patient is out of meds and we could get him a short supply sent in.

## 2018-01-09 NOTE — TELEPHONE ENCOUNTER
Patient's wife Charito, called requesting refill for amlodipine and finasteride. Patient has appointment 1/29/18 with Lisbet. Dr. Grigsby sent 90 with 3 refills for finasteride to the [facility] and they did not fill.

## 2018-01-29 PROBLEM — H47.019 ISCHEMIC OPTIC NEUROPATHY: Status: ACTIVE | Noted: 2017-10-24

## 2018-01-29 PROBLEM — H21.269 IRIS ATROPHY: Status: ACTIVE | Noted: 2017-10-24

## 2018-01-29 PROBLEM — Z96.1 PSEUDOPHAKIA: Status: ACTIVE | Noted: 2017-10-24

## 2018-01-29 PROBLEM — H00.029 HORDEOLUM INTERNUM: Status: ACTIVE | Noted: 2017-10-24

## 2018-01-29 NOTE — PROGRESS NOTES
Chief Complaint   Patient presents with   • Hypertension     refills and labs   • Coronary Artery Disease     a fib   • Benign Prostatic Hypertrophy   • Hypothyroidism        No Known Allergies    HPI: Sam Farr is a 89 y.o. male presents today for multiple chronic visit for HTN stable with amlodipine metoprolol, iron deficiency anemia stable with ferrous sulfate, osteoporosis stable with alendronate and calcium, hypothyroidism stable with levothyroxine, BPH stable with tamsulosin, overactive bladder stable with ditropan XL,  Atrial fib stable with coumadin.  Pt states the he fell when it snowed about a week ago on his dock trying to  a box that all the life jackets were in that spilled all over and he struck his left forearm on a piece of metal and it caused a skin tear.  He cleaned it and put guaze on it and when he went to take it off all the guaze was stuck to it so he cut around the guaze.  Today, it looks awful.  Rates pain 2/10      HTN  Currently takes amlodipine and metoprolol.  Takes medications as prescribed without missed doses, reports no episodes of hypotension or any additional adverse effects from medications(s); Patient reports BP's at home have been stable. Denies chest pain/chest pressure or discomfort, shortness of breath with exertion, headaches, palpitations, irregular heart beat, tachycardia, lower extremity edema, orthopnea, near-syncope.       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   • Hiatal hernia 6/12/2017   • History of anticoagulant therapy    • Hx of colon cancer, stage III    • Hypothyroidism    • Iron deficiency anemia    • Osteoporosis      Past Surgical History:   Procedure Laterality Date   • COLON SURGERY      colon cancer stage 3   •  PACEMAKER IMPLANTATION  2006   • PACEMAKER REPLACEMENT  2012   • TYMPANOPLASTY      replaced     Social History     Social History   • Marital status:      Spouse name: N/A   • Number of children: N/A   • Years of education: N/A     Social History Main Topics   • Smoking status: Never Smoker   • Smokeless tobacco: Never Used   • Alcohol use Yes      Comment: wine occassionally   • Drug use: No   • Sexual activity: Defer     Other Topics Concern   • None     Social History Narrative     Family History   Problem Relation Age of Onset   • Colon cancer Mother    • Heart attack Father    • COPD Sister    • No Known Problems Daughter    • Cancer Son    • Appendicitis Maternal Grandmother    • Heart attack Maternal Grandfather    • No Known Problems Paternal Grandmother    • No Known Problems Paternal Grandfather    • No Known Problems Sister    • No Known Problems Sister    • No Known Problems Sister    • Heart attack Son    • No Known Problems Son    • No Known Problems Son    • No Known Problems Daughter    • No Known Problems Daughter    • No Known Problems Daughter        Current Outpatient Prescriptions on File Prior to Visit   Medication Sig Dispense Refill   • alendronate (FOSAMAX) 70 MG tablet Take 1 tablet by mouth Every 7 (Seven) Days. 90 tablet 1   • amLODIPine (NORVASC) 10 MG tablet Take 1 tablet by mouth Daily. 10 tablet 0   • Calcium-Vitamin D (CALTRATE 600 PLUS-VIT D PO) Take  by mouth daily.     • dutasteride (AVODART) 0.5 MG capsule Daily.     • ferrous sulfate 325 (65 FE) MG tablet Take 325 mg by mouth daily with breakfast.     • finasteride (PROSCAR) 5 MG tablet Take 1 tablet by mouth Daily. 20 tablet 0   • flavoxATE (URISPAS) 100 MG tablet Take 1 tablet by mouth 3 (Three) Times a Day As Needed for bladder spasms. 9 tablet 0   • levothyroxine (SYNTHROID, LEVOTHROID) 100 MCG tablet Take 1 tablet by mouth Daily. 90 tablet 1   • metoprolol tartrate (LOPRESSOR) 50 MG tablet Take 1/2 tab twice daily.  "90 tablet 1   • tamsulosin (FLOMAX) 0.4 MG capsule 24 hr capsule Take 1 capsule by mouth daily.     • warfarin (COUMADIN) 5 MG tablet Take 1 tablet by mouth Daily. Dr. Small manages coumadin. 90 tablet 1   • Mirabegron ER (MYRBETRIQ) 25 MG tablet sustained-release 24 hour 24 hr tablet Take 1 tablet by mouth Daily. 30 tablet 5     No current facility-administered medications on file prior to visit.         REVIEW OF SYMPTOMS: (Positives bolded)  General:  weight loss, fever, chills, night sweats, fatigue, appetite loss  HEENT:  sore throat tinnitus, bloody nose, hearin gloss, sinus pain/pressure, ear pain/pressure.   Respiratory: shortness of breath, cough, hemoptysis, wheezing, pleurisy,   Cardiovascular:  chest pain, PND, palpitation, edema, orthopnea, syncope, swelling of extremities  Gastro: Nausea, vomiting, diarrhea, hematemesis, abdominal pain, constipation  Genito: hematuria, dysuria, glycosuria, hesitancy, frequency, incontinence  Musckelo: Arthralgia, myalgia, muscle weakness, joint swelling, NSAID use  Skin: rash, pruritis, sores, nail changes, skin thickening, change in wart/mole, skin tear to right forearm  Neuro:  Migraine, numbness, ataxia, tremor, vertigo, weakness, memory loss  \"All other systems reviewed and negative, except as listed above.”      OBJECTIVE:  Constitutional:  Appearance-No acute distress, Consistent with stated age. Orientation- Oriented x 3, alert Posture-Not doubled over. Gait-Normal pace, normal arm movement. Posture- Normal Build and Nutrition-Well developed and well nourished.  General- Patient is pleasant and cooperative with the interview and exam.    Integumentary: General-No rashes, ulcers or lesions. No edema.  Has a 5 cm x 3 cm skin tear to his left forearm that has gauze stuck to the whole area. There is mild redness.  I attempted to soak the area and try to debride the guaze but it was extremely hard and stuck to him and I was afraid with him being on coumadin I would " get it bleeding.  I educated him and his wife to soak it 2 times a day in mariya dish washing liquid and warm water.  I explained it may take a day or more to soften and he could return her for debridement and the can try to take it off easily.  Wife states, I will soak it several days and gently remove the guaze and if it is to hard I will have him soak it again.  I explained that all the guaze must be removed.  I gave her a couple packets of bacitracin ointment to apply.  Has good ROM of arm and wrist Left. .     Head/Neck: Head- normocephalic and atraumatic.  Neck- without visible/palpable lumps or pulsations.  Palpation- No bony tenderness about head/neck along frontal, occiptial, temporal, parietal, mastoid, jawline, zygoma, orbit or any other location.  NO temporal artery tenderness. No TMJ tenderness. Normal cervical ROM.   Neck Supple.  Thyroid-No thyromegaly, no nodules    Eye: Bilaterally PERRLA, EOMI.  No discharge.  Upper and lower eyelids are normal. Sclera/conjunctiva normal without discharge. Cornea is normal and clear. Lens is normal.  Eyeball appears normal. No ciliary flushing, no conjunctival injection.    ENMT:  Pinna- normal without tenderness or erythema.  External auditory canal Left- normal without erythema or discharge, no excessive cerumen. External auditory canal Right-normal without erythema or discharge, no excessive cerumen. TM left- Grey/pearly, normal light reflex and anatomy TM Right- Grey/pearly, normal light reflex and anatomy Hearing Assessment-normal to conversational speech at 2-5 feet.  Nose and sinus-No sinus tenderness along frontal/maxillary region. External appearance normal and midline. Nares- bilateral quiet airflow, no discharge. Nasal mucosa- No bleeding noted and no ulcerations observed. Pink, moist. Turbinates non boggy. Lips- normal color, moist without cracks/lesions Oral Cavity/Palate- hard/soft palate intact without lesions, oral mucosa pink and moist. Dentition  assessed and discussed appropriate oral care. Tongue normal midline.  Oropharynx- no pharyngeal erythema, Uvula midline. No post nasal drip. No exudate. Salivary glands- Non tender to palpation    CHEST/LUNG: Inspection- symmetric chest wall no pectus deformity. Normal effort, no distress, no use of accessory muscles. Palpation- nontender sternum, ribline.  No abnormal pulsations. Auscultation- Breath sounds normal throughout all lung fields.  Normal tracheal sounds, Normal bronchial sounds overlying sternum, Bronchovessicular sounds normal between scapulae posteriorly, Normal vessicular breath sounds heard throughout periphery. Lungs are clear today. Adventitious sounds- No wheezes, rales, rhonchi.     CARDIOVASCULAR:  Carotid artery- normal, no bruits or abnormal pulsations. Jugular vein- no pulsations. Palpation/Percussion- Normal PMI, no palpable thrill  Auscultation- Regular rate and rhythm. No murmur noted in sitting, supine positions. Extremities- no digital clubbing, cyanosis, edema, increased warmth.    ABDOMEN: Inspection- normal and no visible pulsations. Normal contour. Auscultation- Bowel sounds normal, no abdominal bruits. Palpation/Percussion- soft, non-tender, no rebound tenderness, no rigidity (guarding), no jar tenderness, no masses.  Liver-no hepatomegaly, Spleen - no splenomegaly, Hernias- none. Rectal not examined.     Peripheral Vascular: Upper extremity Left- Normal temperature with pink nailbeds and no ulcerations.  Upper extremity Right- Normal temperature with pink nailbeds and no ulcerations.  Lower extremity- Normal temperature with pink nailbeds and no ulcerations. DP pulses 2+ bilaterally.  Pedal hair intact.  Normal capillary refill. Edema- No edema.    Musculoskeletal: Generalized-No generalized swelling or edema of extremities, no digital clubbing or cyanosis, neurovascularly intact all four extremities.  Upper extremity- Symmetrical posture.  No visible deformity.  Normal sensation  along medial and lateral upper extremity proximally and distally.  NO tenderness overlying shoulder, lateral/medial epicondyle.  5/5 and strength 5/5 bilateral UE.  Elbow palpated, no tenderness overlying olecranon.  Normal supination, pronation to active/passive ROM and to resisted rotation. Bicep insertion/tricep insertion appear normal without obvious pathology. Rotator cuff evaluated and intact.  Normal wrist ROM bilaterally. Normal hand movement, intrinsic muscles of hands normal. No tenderness to palpation of hands/wrists/elbows.  Lower extremity- Not tender to palpation, no pain, no swelling, edema or erythema of surrounding tissue, normal strength and tone.  Normal appearing hip ROM bilaterally without pain.  Knee ROM normal at 0-120 degrees. No tenderness overlying trochanters, no tenderness about patella, quad tendon, patellar tendon.  No tenderness at tibial tuberosity. Ankle normal ROM not tender to palpation along medial/lateral malleolus. Normal movement of toes, no tenderness bilateral feet/toes.  Normal foot type. Calves symmetrical.  Stretching demonstrated today.  Spine/Ribs- No deformities, masses or tenderness, no known fractures, normal strength, Normal ROM. Normal stability No tenderness along C/T/L spine.  Normal appearing ROM about spine.      Neurological: General- Moves all 4 extremities symmetrically. Symmetrical face and body posture. Cranial nerves- individually evaluated II-XII and intact. PERRLA, Normal EOMI, visual/special senses appear intact, Face is symmetrical and normal sensation/movement, normal tongue, normal strength/posture of neck musculature. Balance- Romberg intact.  Reflexes- ntact with DTR 2+ patellar, Achilles, bicep, brachial,tricep. Ankle clonus normal with 2 beats.  Strength- 5/5 bilateral UE and LE. Soft touch- intact bilateral UE and LE.  Temperature sensation- intact bilateral UE and LE. Cerebellar testing-Rapid alternating movements intact.  Heel shin intact.  Able to walk normal gait, normal heel toe walking. Neck- supple.      Neuropsych: Oriented- Person, place, time. (AAOx3), Mood/affect- normal and congruent. Able to articulate well. Speech-Normal speech, normal rate, normal tone, normal use of language, volume and coherence.  Thought content- normal with ability to perform basic computations and apply abstract thought/reason. Associations- intact, no SI/HI, no hallucinations, delusions, obsessions.  Judgment/insight- Appropriate. Memory-Recall intact, remote and recent memory intact. Knowledge- Age appropriate fund of knowledge, concentration and attention span normal.    Lymphatic: Head/Neck- normal size and non tender to palpation. Axillary- Head and neck LN are normal size and non tender to palpation. Femoral and Inguinal- normal size and non tender to palpation.      Assessment/Plan:  Sam was seen today for hypertension, coronary artery disease, benign prostatic hypertrophy and hypothyroidism.    Diagnoses and all orders for this visit:    Other iron deficiency anemia  -     ferrous sulfate 325 (65 FE) MG tablet; Take 1 tablet by mouth Daily With Breakfast.    Osteoporosis without current pathological fracture, unspecified osteoporosis type  -     alendronate (FOSAMAX) 70 MG tablet; Take 1 tablet by mouth Every 7 (Seven) Days.    Essential hypertension  -     Discontinue: amLODIPine (NORVASC) 10 MG tablet; Take 1 tablet by mouth Daily.  -     Discontinue: metoprolol tartrate (LOPRESSOR) 50 MG tablet; Take 1/2 tab twice daily.  -     amLODIPine (NORVASC) 10 MG tablet; Take 1 tablet by mouth Daily.  -     metoprolol tartrate (LOPRESSOR) 50 MG tablet; Take 1/2 tab twice daily.  -     warfarin (COUMADIN) 5 MG tablet; Take 1 tablet by mouth Daily. Dr. Small manages coumadin.  -     CBC & Differential  -     Comprehensive metabolic panel; Future  -     Lipid panel; Future    Elevated prostate specific antigen (PSA)  -     warfarin (COUMADIN) 5 MG tablet; Take 1  tablet by mouth Daily. Dr. Small manages coumadin.    Hematuria, unspecified type  -     dutasteride (AVODART) 0.5 MG capsule; Take 1 capsule by mouth Daily.    Acute cystitis with hematuria    Other specified hypothyroidism  -     levothyroxine (SYNTHROID, LEVOTHROID) 100 MCG tablet; Take 1 tablet by mouth Daily.    OAB (overactive bladder)    Overactive bladder  -     oxybutynin XL (DITROPAN-XL) 5 MG 24 hr tablet; Take 1 tablet by mouth Every Night.    Chronic atrial fibrillation  -     warfarin (COUMADIN) 5 MG tablet; Take 1 tablet by mouth Daily. Dr. Small manages coumadin.    Skin tear of left elbow without complication, initial encounter  -     Tdap Vaccine Greater Than or Equal To 6yo IM      Management plan:  Take medications as prescribed; return to the clinic of new or worsening concerns.       Risks/benefits of current and new medications discussed with the patient and or family today.  The patient/family are aware and accept that if there any side effects they should call or return to clinic as soon as possible.  Appropriate F/U discussed for topics addressed today. All questions were answered to the satisfactory state of patient/family.  Should symptoms fail to improve or worsen they agree to call or return to clinic or to go to the ER. Education handouts were offered on any new Rx if requested.  Discussed the importance of following up with any needed screening tests/labs/specialist appointments and any requested follow-up recommended by me today.  Importance of maintaining follow-up discussed and patient accepts that missed appointments can delay diagnosis and potentially lead to worsening of conditions.    Return in about 6 months (around 7/29/2018), or if symptoms worsen or fail to improve.    Lisbet Whyte, DNP, APRN-BC  01/29/2018

## 2018-02-09 NOTE — TELEPHONE ENCOUNTER
Prescription has been called into [facility] pharmacy in [place]. I also called and spoke with pt wife to let her know as well. Pt wife verbalized understanding.

## 2018-02-15 NOTE — TELEPHONE ENCOUNTER
Patient's wife have spoke with [facility] in [place] and they told her they haven't received this medication. Please advise.

## 2018-02-16 NOTE — TELEPHONE ENCOUNTER
Medication was called into [facility] pharmacy in [place]. Pt was notified of this as well.

## 2018-02-27 NOTE — PROGRESS NOTES
[facility] [address]  [id number]  Phone: [phone]  Fax: [phone]    OFFICE VISIT:  2/27/2018    [address]: 9/8/1924    Reason For Visit:  Jeronimo Yu is a 80 y.o. male who is here for 6 Month Follow-Up (no cardiac symptoms) and Atrial Fibrillation (with SSS)  Patient has chronic atrial fibrillation with pacemaker. He is anticoagulated on Coumadin    Subjective  Jeronimo Yu denies exertional chest pain. He has some NICOLE but no resting shortness of breath, orthopnea, paroxysmal nocturnal dyspnea, syncope, presyncope, arrhythmia, edema and fatigue. The patient denies numbness or weakness to suggest cerebrovascular accident or transient ischemic attack. He uses a cane for unsteady gait. He states he gets out and works a few hours every day. No recent falls  Jennifer Ou, NP is PCP and follows labs including lipids.   Krishan Rollins has the following history as recorded in [facility]:    Patient Active Problem List    Diagnosis Date Noted    Atrial fibrillation, chronic ([address] 06/30/2014    CAD (coronary artery disease)     GERD (gastroesophageal reflux disease)     Atrial fibrillation ([address]     Hypertension     Pacemaker      Past Medical History:   Diagnosis Date    Atrial fibrillation, chronic ([address] 6/30/14    s/p cardioversion    ear surgery     GERD (gastroesophageal reflux disease)     Hypertension     Pacemaker     gen change 8/5/13     Past Surgical History:   Procedure Laterality Date    CARDIOVERSION  4/7/06, 5/11/06 6/30/14    CATARACT REMOVAL      left eye     DENTAL SURGERY      PACEMAKER INSERTION  8/5/13    gen change    PACEMAKER INSERTION  06/18/09, JDT    Dual chamber rate responsive pacemaker     Family History   Problem Relation Age of Onset    Hypertension Mother     Cancer Mother     Coronary Art Dis Father     Hypertension Sister     Hypertension Sister      Social History   Substance Use Topics    difficulty, facial asymmetry or lateralizing weakness. No seizures, presyncope, syncope, or significant dizziness. Hematologic  no easy bruising or excessive bleeding. Psychiatric  no severe anxiety or insomnia. No confusion. All other review of systems are negative. Objective  Vital Signs - /76   Pulse 86   Ht 5' 6\" (1.676 m)   Wt 169 lb (76.7 kg)   BMI 27.28 kg/m²   General - Luis A Whitley is alert, cooperative, and pleasant. Well groomed. No acute distress. Body habitus is normal.  HEENT  The head is normocephalic. No circumoral cyanosis. Dentition is normal.   EYES -  No Xanthelasma, no arcus senilis, no conjunctival hemorrhages or discharge. Neck - Supple, without increased jugular venous pressures. No carotid bruits. No mass. Respiratory - Lungs are clear bilaterally. No wheezes or rales. Normal effort without use of accessory muscles. Cardiovascular  Heart has regular rhythm and rate. No murmurs, rubs or gallops. + pedal pulses and no varicosities. Abdominal -  Soft, nontender, nondistended. Bowel sounds are intact. Extremities - No clubbing, cyanosis, or  edema. Musculoskeletal -  No clubbing . No Osler's nodes. Gait - uses cane. No kyphosis or scoliosis. Skin -  no statis ulcers or dermatitis. Neurological - No focal signs are identified. Oriented to person, place and time. Psychiatric -  Appropriate affect and mood. Assessment:    1. Atrial fibrillation, chronic (HCC)  POCT INR   2. Pacemaker       Data:  BP Readings from Last 3 Encounters:   02/27/18 124/76   08/29/17 128/68   08/19/16 106/60    Pulse Readings from Last 3 Encounters:   02/27/18 86   08/29/17 78   08/19/16 84        Pacemaker check showed adequate battery status- approximately 3 years   and  appropriate diagnostics without any sustained arrhythmias.    Mode VVIR at 60  Sensed 53%  Paced 47%  Next check in 3 months via [employer] home monitoring system    Anticoagulated on Coumadin. INR therapeutic today at 2.3  Does quite well for age. We discussed fall precautions especially being anticoagulated on Coumadin   States taking medications as prescribed  Stable cardiovascular status. No evidence of overt heart failure, angina     Plan  Continue same coumadin dosing and recheck in 4 weeks. Send [employer] home pacemaker reading on 5-29-18   Follow up in Aug With Dr. Prince Andrew   Call with any questions or concerns  Follow up with Daniel Rosas NP for non cardiac problems  Report any new problems  Cardiovascular Fitness-Exercise as tolerated. Fall precautions   Cardiac / Healthy Diet  Continue current medications as directed  Continue plan of treatment  It is always recommended that you bring your medications bottles with you to each visit - this is for your safety!        Carlen Peabody, APRN

## 2018-03-27 NOTE — PROGRESS NOTES
Subjective    Mr. Farr is 89 y.o. male    CHIEF COMPLAINT: BPH    Patient comes in the office today for follow-up of BPH at the present time he is taking finasteride and also [facility] to oxybutynin 5 mg yes he seems to doing reasonably well with that and he is comfortable with his symptoms at this time    He had not had any gross hematuria is had no flank pain no real burning and occasional frequency and urgency but for the most part again his symptoms seem to be stable.    His overactive bladder symptoms are also stable on the oxybutynin 5 again I Told him sometimes will see more side effects with that but he seems to be tolerating it reasonably well in the [facility] not [facility] so we will just keep him on      History of Present Illness      The following portions of the patient's history were reviewed and updated as appropriate: allergies, current medications, past family history, past medical history, past social history, past surgical history and problem list.    Review of Systems   Constitutional: Negative.  Negative for chills and fever.   Gastrointestinal: Negative for abdominal distention, abdominal pain, anal bleeding, blood in stool and nausea.   Genitourinary: Negative for difficulty urinating, dysuria, flank pain, frequency, hematuria and urgency.   Psychiatric/Behavioral: Negative.  Negative for agitation and confusion.         Current Outpatient Prescriptions:   •  alendronate (FOSAMAX) 70 MG tablet, Take 1 tablet by mouth Every 7 (Seven) Days., Disp: 12 tablet, Rfl: 2  •  amLODIPine (NORVASC) 10 MG tablet, Take 1 tablet by mouth Daily., Disp: 90 tablet, Rfl: 1  •  Calcium-Vitamin D (CALTRATE 600 PLUS-VIT D PO), Take  by mouth daily., Disp: , Rfl:   •  dutasteride (AVODART) 0.5 MG capsule, Take 1 capsule by mouth Daily., Disp: 90 capsule, Rfl: 1  •  ferrous sulfate 325 (65 FE) MG tablet, Take 1 tablet by mouth Daily With Breakfast., Disp: 90 tablet, Rfl: 1  •   finasteride (PROSCAR) 5 MG tablet, Take 1 tablet by mouth Daily., Disp: 20 tablet, Rfl: 0  •  levothyroxine (SYNTHROID, LEVOTHROID) 100 MCG tablet, Take 1 tablet by mouth Daily., Disp: 90 tablet, Rfl: 1  •  metoprolol tartrate (LOPRESSOR) 50 MG tablet, Take 1/2 tab twice daily., Disp: 90 tablet, Rfl: 1  •  oxybutynin XL (DITROPAN-XL) 5 MG 24 hr tablet, Take 1 tablet by mouth Every Night., Disp: 90 tablet, Rfl: 1  •  warfarin (COUMADIN) 5 MG tablet, Take 1 tablet by mouth Daily. Dr. 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   • Hiatal hernia 6/12/2017   • History of anticoagulant therapy    • Hx of colon cancer, stage III    • Hypothyroidism    • Iron deficiency anemia    • Osteoporosis        Past Surgical History:   Procedure Laterality Date   • COLON SURGERY      colon cancer stage 3   • PACEMAKER IMPLANTATION  2006   • PACEMAKER REPLACEMENT  2012   • TYMPANOPLASTY      replaced       Social History     Social History   • Marital status:      Social History Main Topics   • Smoking status: Never Smoker   • Smokeless tobacco: Never Used   • Alcohol use Yes      Comment: wine occassionally   • Drug use: No   • Sexual activity: Defer     Other Topics Concern   • Not on file       Family History   Problem Relation Age of Onset   • Colon cancer Mother    • Heart attack Father    • COPD Sister    • No Known Problems Daughter    • Cancer Son    • Appendicitis Maternal Grandmother    • Heart attack Maternal Grandfather    • No Known Problems Paternal Grandmother    • No Known Problems Paternal Grandfather    • No Known Problems Sister    • No Known Problems Sister    • No Known Problems Sister    • Heart attack Son    • No Known Problems Son    •  "No Known Problems Son    • No Known Problems Daughter    • No Known Problems Daughter    • No Known Problems Daughter        Objective    Temp 97.9 °F (36.6 °C)   Ht 167.6 cm (66\")   Wt 72.6 kg (160 lb)   BMI 25.82 kg/m²     Physical Exam      Office Visit on 01/29/2018   Component Date Value Ref Range Status   • WBC 01/30/2018 9.6  3.4 - 10.8 x10E3/uL Final   • RBC 01/30/2018 4.96  4.14 - 5.80 x10E6/uL Final   • Hemoglobin 01/30/2018 16.2  13.0 - 17.7 g/dL Final   • Hematocrit 01/30/2018 47.9  37.5 - 51.0 % Final   • MCV 01/30/2018 97  79 - 97 fL Final   • MCH 01/30/2018 32.7  26.6 - 33.0 pg Final   • MCHC 01/30/2018 33.8  31.5 - 35.7 g/dL Final   • RDW 01/30/2018 14.1  12.3 - 15.4 % Final   • Platelets 01/30/2018 228  150 - 379 x10E3/uL Final   • Neutrophil Rel % 01/30/2018 51  Not Estab. % Final   • Lymphocyte Rel % 01/30/2018 39  Not Estab. % Final   • Monocyte Rel % 01/30/2018 9  Not Estab. % Final   • Eosinophil Rel % 01/30/2018 1  Not Estab. % Final   • Basophil Rel % 01/30/2018 0  Not Estab. % Final   • Neutrophils Absolute 01/30/2018 4.8  1.4 - 7.0 x10E3/uL Final   • Lymphocytes Absolute 01/30/2018 3.7* 0.7 - 3.1 x10E3/uL Final   • Monocytes Absolute 01/30/2018 0.9  0.1 - 0.9 x10E3/uL Final   • Eosinophils Absolute 01/30/2018 0.1  0.0 - 0.4 x10E3/uL Final   • Basophils Absolute 01/30/2018 0.0  0.0 - 0.2 x10E3/uL Final   • Immature Granulocyte Rel % 01/30/2018 0  Not Estab. % Final   • Immature Grans Absolute 01/30/2018 0.0  0.0 - 0.1 x10E3/uL Final   • Glucose 01/30/2018 102* 70 - 100 mg/dL Final   • BUN 01/30/2018 19  5 - 21 mg/dL Final   • Creatinine 01/30/2018 0.89  0.50 - 1.40 mg/dL Final   • eGFR Non  Am 01/30/2018 80  >60 mL/min/1.73 Final    Comment: The MDRD GFR formula is only valid for adults with stable  renal function between ages 18 and 70.     • eGFR  Am 01/30/2018 97  >60 mL/min/1.73 Final   • BUN/Creatinine Ratio 01/30/2018 21.3  7.0 - 25.0 Final   • Sodium 01/30/2018 143  " 135 - 145 mmol/L Final   • Potassium 01/30/2018 4.2  3.5 - 5.3 mmol/L Final   • Chloride 01/30/2018 100  98 - 110 mmol/L Final   • Total CO2 01/30/2018 28.0  24.0 - 31.0 mmol/L Final   • Calcium 01/30/2018 10.3  8.4 - 10.4 mg/dL Final   • Total Protein 01/30/2018 8.3  6.3 - 8.7 g/dL Final   • Albumin 01/30/2018 4.90  3.50 - 5.00 g/dL Final   • Globulin 01/30/2018 3.4  gm/dL Final   • A/G Ratio 01/30/2018 1.4  1.1 - 2.5 g/dL Final   • Total Bilirubin 01/30/2018 1.0  0.1 - 1.0 mg/dL Final   • Alkaline Phosphatase 01/30/2018 65  24 - 120 U/L Final   • AST (SGOT) 01/30/2018 38  7 - 45 U/L Final   • ALT (SGPT) 01/30/2018 35  0 - 54 U/L Final   • Total Cholesterol 01/30/2018 151  130 - 200 mg/dL Final   • Triglycerides 01/30/2018 83  0 - 149 mg/dL Final   • HDL Cholesterol 01/30/2018 55  >=40 mg/dL Final   • VLDL Cholesterol 01/30/2018 16.6  mg/dL Final   • LDL Cholesterol  01/30/2018 79  0 - 99 mg/dL Final       Results for orders placed or performed in visit on 03/27/18   POC Urinalysis Dipstick, Automated   Result Value Ref Range    Color Yellow Yellow, Straw, Dark Yellow, Maria Ines    Clarity, UA Clear Clear    Glucose, UA Negative Negative, 1000 mg/dL (3+) mg/dL    Bilirubin Negative Negative    Ketones, UA Negative Negative    Specific Gravity  1.010 1.005 - 1.030    Blood, UA Trace (A) Negative    pH, Urine 5.0 5.0 - 8.0    Protein, POC Negative Negative mg/dL    Urobilinogen, UA Normal Normal    Leukocytes Negative Negative    Nitrite, UA Negative Negative       Assessment and Plan    Diagnoses and all orders for this visit:    Elevated prostate specific antigen (PSA)  -     POC Urinalysis Dipstick, Automated    OAB (overactive bladder)    Benign prostatic hyperplasia with lower urinary tract symptoms, symptom details unspecified    Plan--on one to see the patient back again in a year he will continue his present medications if he has any difficulties this is not working he will let me know

## 2018-03-29 NOTE — PROGRESS NOTES
[facility]  HEMATOLOGY & ONCOLOGY    Cancer Staging Information:  No matching staging information was found for the patient.      Subjective     VISIT DIAGNOSIS:   No diagnosis found.    REASON FOR VISIT:     No chief complaint on file.       HEMATOLOGY / ONCOLOGY HISTORY:    No history exists.           INTERVAL HISTORY  Patient ID: Sam Farr is a 89 y.o. year old male with a lot of comorbidities, bilateral disease, hypertension, hyperlipidemia, HFA on warfarin, hypothyroidism, who is here to follow-up on his stage III colon cancer.  Given his age, patient declined chemotherapy.  He is under observation.  He denies diarrhea or constipation, denies melena or bright red blood per rectum.  He did have a couple of nosebleeds 2 weeks ago that was just two drops and then resolved.  He did not seek medical attention.  -- no new issues. Chronic dizziness. Work-up negative.  Past Medical History:   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   • Hiatal hernia 6/12/2017   • History of anticoagulant therapy    • Hx of colon cancer, stage III    • Hypothyroidism    • Iron deficiency anemia    • Osteoporosis      Past Surgical History:   Past Surgical History:   Procedure Laterality Date   • COLON SURGERY      colon cancer stage 3   • PACEMAKER IMPLANTATION  2006   • PACEMAKER REPLACEMENT  2012   • TYMPANOPLASTY      replaced     Social History:   Social History     Social History   • Marital status:      Spouse name: N/A   • Number of children: N/A   • Years of education: N/A     Occupational History   • Not on file.     Social History Main Topics   • Smoking status: Never Smoker   • Smokeless tobacco: Never Used   • Alcohol use Yes      Comment: wine occassionally   • Drug use:  No   • Sexual activity: Defer     Other Topics Concern   • Not on file     Social History Narrative   • No narrative on file     Family History:   Family History   Problem Relation Age of Onset   • Colon cancer Mother    • Heart attack Father    • COPD Sister    • No Known Problems Daughter    • Cancer Son    • Appendicitis Maternal Grandmother    • Heart attack Maternal Grandfather    • No Known Problems Paternal Grandmother    • No Known Problems Paternal Grandfather    • No Known Problems Sister    • No Known Problems Sister    • No Known Problems Sister    • Heart attack Son    • No Known Problems Son    • No Known Problems Son    • No Known Problems Daughter    • No Known Problems Daughter    • No Known Problems Daughter        Review of Systems   Constitutional: Negative.    HENT: Positive for hearing loss and nosebleeds.    Respiratory: Negative.    Cardiovascular: Negative.    Gastrointestinal: Negative.    Endocrine: Negative.    Genitourinary: Negative.    Musculoskeletal: Negative.    Skin: Negative.    Allergic/Immunologic: Negative.    Neurological: Positive for dizziness.   Hematological: Bruises/bleeds easily.   Psychiatric/Behavioral: Negative.         Performance Status:  Asymptomatic    Medications:    Current Outpatient Prescriptions   Medication Sig Dispense Refill   • alendronate (FOSAMAX) 70 MG tablet Take 1 tablet by mouth Every 7 (Seven) Days. 12 tablet 2   • amLODIPine (NORVASC) 10 MG tablet Take 1 tablet by mouth Daily. 90 tablet 1   • Calcium-Vitamin D (CALTRATE 600 PLUS-VIT D PO) Take  by mouth daily.     • dutasteride (AVODART) 0.5 MG capsule Take 1 capsule by mouth Daily. 90 capsule 1   • ferrous sulfate 325 (65 FE) MG tablet Take 1 tablet by mouth Daily With Breakfast. 90 tablet 1   • finasteride (PROSCAR) 5 MG tablet Take 1 tablet by mouth Daily. 20 tablet 0   • levothyroxine (SYNTHROID, LEVOTHROID) 100 MCG tablet Take 1 tablet by mouth Daily. 90 tablet 1   • metoprolol tartrate  "(LOPRESSOR) 50 MG tablet Take 1/2 tab twice daily. 90 tablet 1   • oxybutynin XL (DITROPAN-XL) 5 MG 24 hr tablet Take 1 tablet by mouth Every Night. 90 tablet 1   • warfarin (COUMADIN) 5 MG tablet Take 1 tablet by mouth Daily. Dr. Small manages coumadin. 90 tablet 1     No current facility-administered medications for this visit.        ALLERGIES:  No Known Allergies    Objective      Vitals:    03/29/18 1125   BP: 146/82   Pulse: 84   Resp: 16   Temp: 97 °F (36.1 °C)   TempSrc: Tympanic   SpO2: 96%   Weight: 72.4 kg (159 lb 9.6 oz)   Height: 167.6 cm (65.98\")         Current Status 3/29/2018   ECOG score 1         Physical Exam      General Appearance: Patient is awake, alert, oriented and in no acute distress. Patient is welldeveloped, wellnourished, and appears stated age.  HEENT: Normocephalic. Sclerae clear, conjunctiva pink, extraocular movements intact, pupils, round, reactive to light and  accommodation. Mouth and throat are clear with moist oral mucosa.  NECK: Supple, no jugular venous distention, thyroid not enlarged.  LYMPH: No cervical, supraclavicular, axillary, or inguinal lymphadenopathy.  CHEST: Equal bilateral expansion, AP  diameter normal, resonant percussion note  LUNGS: Good air movement, no rales, rhonchi, rubs or wheezes with auscultation  CARDIO: Regular sinus rhythm, no murmurs, gallops or rubs.  ABDOMEN: Nondistended, soft, No tenderness, no guarding, no rebound, No hepatosplenomegaly. No abdominal masses. Bowel sounds positive. No hernia  GENITALIA: Not examined.  BREASTS: Not examined.  MUSKEL: No joint swelling, decreased motion, or inflammation  EXTREMS: Bruises on the bilateral extensor surface of upper extremity.  No edema, clubbing, cyanosis, No varicose veins.  NEURO: Grossly nonfocal, Gait is coordinated and smooth, Cognition is preserved.  SKIN: No rashes, no ecchymoses, no petechia.  PSYCH: Oriented to time, place and person. Memory is preserved. Mood and affect appear " normal    RECENT LABS:  Orders Only on 03/28/2018   Component Date Value Ref Range Status   • WBC 03/29/2018 8.93  4.80 - 10.80 10*3/mm3 Final   • RBC 03/29/2018 4.44* 4.80 - 5.90 10*6/mm3 Final   • Hemoglobin 03/29/2018 14.3  14.0 - 18.0 g/dL Final   • Hematocrit 03/29/2018 42.6  40.0 - 52.0 % Final   • MCV 03/29/2018 95.9* 82.0 - 95.0 fL Final   • MCH 03/29/2018 32.2* 28.0 - 32.0 pg Final   • MCHC 03/29/2018 33.6  33.0 - 36.0 g/dL Final   • RDW 03/29/2018 13.5  12.0 - 15.0 % Final   • RDW-SD 03/29/2018 47.9  40.0 - 54.0 fl Final   • MPV 03/29/2018 9.7  6.0 - 12.0 fL Final   • Platelets 03/29/2018 220  130 - 400 10*3/mm3 Final   • Neutrophil % 03/29/2018 52.3  39.0 - 78.0 % Final   • Lymphocyte % 03/29/2018 34.7  15.0 - 45.0 % Final   • Monocyte % 03/29/2018 11.0  4.0 - 12.0 % Final   • Eosinophil % 03/29/2018 0.7  0.0 - 4.0 % Final   • Basophil % 03/29/2018 0.6  0.0 - 2.0 % Final   • Immature Grans % 03/29/2018 0.7  0.0 - 5.0 % Final   • Neutrophils, Absolute 03/29/2018 4.68  1.87 - 8.40 10*3/mm3 Final   • Lymphocytes, Absolute 03/29/2018 3.10  0.72 - 4.86 10*3/mm3 Final   • Monocytes, Absolute 03/29/2018 0.98  0.19 - 1.30 10*3/mm3 Final   • Eosinophils, Absolute 03/29/2018 0.06  0.00 - 0.70 10*3/mm3 Final   • Basophils, Absolute 03/29/2018 0.05  0.00 - 0.20 10*3/mm3 Final   • Immature Grans, Absolute 03/29/2018 0.06* 0.00 - 0.03 10*3/mm3 Final   • nRBC 03/29/2018 0.0  0.0 - 0.0 /100 WBC Final   Office Visit on 03/27/2018   Component Date Value Ref Range Status   • Color 03/27/2018 Yellow  Yellow, Straw, Dark Yellow, Maria Ines Final   • Clarity, UA 03/27/2018 Clear  Clear Final   • Glucose, UA 03/27/2018 Negative  Negative, 1000 mg/dL (3+) mg/dL Final   • Bilirubin 03/27/2018 Negative  Negative Final   • Ketones, UA 03/27/2018 Negative  Negative Final   • Specific Gravity  03/27/2018 1.010  1.005 - 1.030 Final   • Blood, UA 03/27/2018 Trace* Negative Final   • pH, Urine 03/27/2018 5.0  5.0 - 8.0 Final   • Protein, POC  03/27/2018 Negative  Negative mg/dL Final   • Urobilinogen, UA 03/27/2018 Normal  Normal Final   • Leukocytes 03/27/2018 Negative  Negative Final   • Nitrite, UA 03/27/2018 Negative  Negative Final       RADIOLOGY:  No results found.         Assessment/Plan  Sam Farr is a 89 y.o. year old male with a lot of comorbidities, atrial fibrillation with pacemaker, on warfarin, coronary artery disease, hypothyroidism, hypertension, diverticula disease, an large prostate, with stage III colon cancer on observation here for follow-up.    Patient Active Problem List   Diagnosis   • Atrial fibrillation   • Chronic coronary artery disease   • Gastroesophageal reflux disease   • Hypertension   • Pacemaker   • Colon cancer   • Diverticula of colon   • Hiatal hernia   • Inguinal hernia   • Adrenal adenoma   • Enlarged prostate   • Elevated prostate specific antigen (PSA)   • BPH (benign prostatic hypertrophy) with urinary obstruction   • Acute cystitis without hematuria   • OAB (overactive bladder)   • Benign prostatic hyperplasia with lower urinary tract symptoms   • Iris atrophy   • Ischemic optic neuropathy   • Hordeolum internum   • Pseudophakia        1. Stage III colon cancer: Colon Ca, 5/11+ LN, S/P resection. Because of age, he did not consider any chemo. Currently on OBSERVATION. Recent CT scan 11/2016: Indeterminate 7mm lesion in L pelvic area. Indeterminate L4 lesion And enlarged Prostate with NL PSA .  He had colonoscopy due to GI bleeding in May, there was an adenomatious polyp that was resected.  Currently no melena or bright red per rectum. last CEA normal does not appear to have any disease recurrence we will follow CEA today and act accordingly.  We will see him back in 6 months per his request.    2.  Benign prostatic hyperplasia: Follows urology, Eduard Grigsby MD is his physician.    3.  Atrial fibrillation on Coumadin.  Advised patient to seek medical attention should he develope nosebleed, easy  bruising, purpuric rash as that might be sign of supratherapeutic INR.    Lana Lockwood MD    3/29/2018    11:29 AM

## 2018-06-18 NOTE — TELEPHONE ENCOUNTER
Patient's wife called and she checked with the pharmacy and there is no prescription. Can you please re-fax?

## 2018-08-01 NOTE — PATIENT INSTRUCTIONS
Exercising to Lose Weight  Exercising can help you to lose weight. In order to lose weight through exercise, you need to do vigorous-intensity exercise. You can tell that you are exercising with vigorous intensity if you are breathing very hard and fast and cannot hold a conversation while exercising.  Moderate-intensity exercise helps to maintain your current weight. You can tell that you are exercising at a moderate level if you have a higher heart rate and faster breathing, but you are still able to hold a conversation.  How often should I exercise?  Choose an activity that you enjoy and set realistic goals. Your health care provider can help you to make an activity plan that works for you. Exercise regularly as directed by your health care provider. This may include:  · Doing resistance training twice each week, such as:  ? Push-ups.  ? Sit-ups.  ? Lifting weights.  ? Using resistance bands.  · Doing a given intensity of exercise for a given amount of time. Choose from these options:  ? 150 minutes of moderate-intensity exercise every week.  ? 75 minutes of vigorous-intensity exercise every week.  ? A mix of moderate-intensity and vigorous-intensity exercise every week.    Children, pregnant women, people who are out of shape, people who are overweight, and older adults may need to consult a health care provider for individual recommendations. If you have any sort of medical condition, be sure to consult your health care provider before starting a new exercise program.  What are some activities that can help me to lose weight?  · Walking at a rate of at least 4.5 miles an hour.  · Jogging or running at a rate of 5 miles per hour.  · Biking at a rate of at least 10 miles per hour.  · Lap swimming.  · Roller-skating or in-line skating.  · Cross-country skiing.  · Vigorous competitive sports, such as football, basketball, and soccer.  · Jumping rope.  · Aerobic dancing.  How can I be more active in my  day-to-day activities?  · Use the stairs instead of the elevator.  · Take a walk during your lunch break.  · If you drive, park your car farther away from work or school.  · If you take public transportation, get off one stop early and walk the rest of the way.  · Make all of your phone calls while standing up and walking around.  · Get up, stretch, and walk around every 30 minutes throughout the day.  What guidelines should I follow while exercising?  · Do not exercise so much that you hurt yourself, feel dizzy, or get very short of breath.  · Consult your health care provider prior to starting a new exercise program.  · Wear comfortable clothes and shoes with good support.  · Drink plenty of water while you exercise to prevent dehydration or heat stroke. Body water is lost during exercise and must be replaced.  · Work out until you breathe faster and your heart beats faster.  This information is not intended to replace advice given to you by your health care provider. Make sure you discuss any questions you have with your health care provider.  Document Released: 01/20/2012 Document Revised: 05/25/2017 Document Reviewed: 05/21/2015  [employer] Interactive Patient Education © 2018 [employer]      MyPlate from [employer]  The general, healthful diet is based on the 2010 Dietary Guidelines for Americans. The amount of food you need to eat from each food group depends on your age, sex, and level of physical activity and can be individualized by a dietitian. Go to ChooseMyPlate.gov for more information.  What do I need to know about the MyPlate plan?  · Enjoy your food, but eat less.  · Avoid oversized portions.  ? ½ of your plate should include fruits and vegetables.  ? ¼ of your plate should be grains.  ? ¼ of your plate should be protein.  Grains  · Make at least half of your grains whole grains.  · For a 2,000 calorie daily food plan, eat 6 oz every day.  · 1 oz is about 1 slice bread, 1 cup cereal, or ½ cup cooked  rice, cereal, or pasta.  Vegetables  · Make half your plate fruits and vegetables.  · For a 2,000 calorie daily food plan, eat 2½ cups every day.  · 1 cup is about 1 cup raw or cooked vegetables or vegetable juice or 2 cups raw leafy greens.  Fruits  · Make half your plate fruits and vegetables.  · For a 2,000 calorie daily food plan, eat 2 cups every day.  · 1 cup is about 1 cup fruit or 100% fruit juice or ½ cup dried fruit.  Protein  · For a 2,000 calorie daily food plan, eat 5½ oz every day.  · 1 oz is about 1 oz meat, poultry, or fish, ¼ cup cooked beans, 1 egg, 1 Tbsp peanut butter, or ½ oz nuts or seeds.  Dairy  · Switch to fat-free or low-fat (1%) milk.  · For a 2,000 calorie daily food plan, eat 3 cups every day.  · 1 cup is about 1 cup milk or yogurt or soy milk (soy beverage), 1½ oz natural cheese, or 2 oz processed cheese.  Fats, Oils, and Empty Calories  · Only small amounts of oils are recommended.  · Empty calories are calories from solid fats or added sugars.  · Compare sodium in foods like soup, bread, and frozen meals. Choose the foods with lower numbers.  · Drink water instead of sugary drinks.  What foods can I eat?  Grains  Whole grains such as whole wheat, quinoa, millet, and bulgur. Bread, rolls, and pasta made from whole grains. Brown or wild rice. Hot or cold cereals made from whole grains and without added sugar.  Vegetables  All fresh vegetables, especially fresh red, dark green, or orange vegetables. Peas and beans. Low-sodium frozen or canned vegetables prepared without added salt. Low-sodium vegetable juices.  Fruits  All fresh, frozen, and dried fruits. Canned fruit packed in water or fruit juice without added sugar. Fruit juices without added sugar.  Meats and Other Protein Sources  Boiled, baked, or grilled lean meat trimmed of fat. Skinless poultry. Fresh seafood and shellfish. Canned seafood packed in water. Unsalted nuts and unsalted nut butters. Tofu. Dried beans and pea.  Eggs.  Dairy  Low-fat or fat-free milk, yogurt, and cheeses.  Sweets and Desserts  Frozen desserts made from low-fat milk.  Fats and Oils  Olive, peanut, and canola oils and margarine. Salad dressing and mayonnaise made from these oils.  Other  Soups and casseroles made from allowed ingredients and without added fat or salt.  The items listed above may not be a complete list of recommended foods or beverages. Contact your dietitian for more options.  What foods are not recommended?  Grains  Sweetened, low-fiber cereals. Packaged baked goods. Snack crackers and chips. Cheese crackers, butter crackers, and biscuits. Frozen waffles, sweet breads, doughnuts, pastries, packaged baking mixes, pancakes, cakes, and cookies.  Vegetables  Regular canned or frozen vegetables or vegetables prepared with salt. Canned tomatoes. Canned tomato sauce. Fried vegetables. Vegetables in cream sauce or cheese sauce.  Fruits  Fruits packed in syrup or made with added sugar.  Meats and Other Protein Sources  Marbled or fatty meats such as ribs. Poultry with skin. Fried meats, poultry, eggs, or fish. Sausages, hot dogs, and deli meats such as pastrami, bologna, or salami.  Dairy  Whole milk, cream, cheeses made from whole milk, sour cream. Ice cream or yogurt made from whole milk or with added sugar.  Beverages  For adults, no more than one alcoholic drink per day. Regular soft drinks or other sugary beverages. Juice drinks.  Sweets and Desserts  Sugary or fatty desserts, candy, and other sweets.  Fats and Oils  Solid shortening or partially hydrogenated oils. Solid margarine. Margarine that contains trans fats. Butter.  The items listed above may not be a complete list of foods and beverages to avoid. Contact your dietitian for more information.  This information is not intended to replace advice given to you by your health care provider. Make sure you discuss any questions you have with your health care provider.  Document Released:  01/06/2009 Document Revised: 05/25/2017 Document Reviewed: 11/26/2014  [email or web address] Interactive Patient Education © 2018 [email or web address]      Calorie Counting for Weight Loss  Calories are units of energy. Your body needs a certain amount of calories from food to keep you going throughout the day. When you eat more calories than your body needs, your body stores the extra calories as fat. When you eat fewer calories than your body needs, your body burns fat to get the energy it needs.  Calorie counting means keeping track of how many calories you eat and drink each day. Calorie counting can be helpful if you need to lose weight. If you make sure to eat fewer calories than your body needs, you should lose weight. Ask your health care provider what a healthy weight is for you.  For calorie counting to work, you will need to eat the right number of calories in a day in order to lose a healthy amount of weight per week. A dietitian can help you determine how many calories you need in a day and will give you suggestions on how to reach your calorie goal.  · A healthy amount of weight to lose per week is usually 1-2 lb (0.5-0.9 kg). This usually means that your daily calorie intake should be reduced by 500-750 calories.  · Eating 1,200 - 1,500 calories per day can help most women lose weight.  · Eating 1,500 - 1,800 calories per day can help most men lose weight.    What do I need to know about calorie counting?  In order to meet your daily calorie goal, you will need to:  · Find out how many calories are in each food you would like to eat. Try to do this before you eat.  · Decide how much of the food you plan to eat.  · Write down what you ate and how many calories it had. Doing this is called keeping a food log.    To successfully lose weight, it is important to balance calorie counting with a healthy lifestyle that includes regular activity. Aim for 150 minutes of moderate exercise (such as walking) or 75 minutes of vigorous  exercise (such as running) each week.  Where do I find calorie information?    The number of calories in a food can be found on a Nutrition Facts label. If a food does not have a Nutrition Facts label, try to look up the calories online or ask your dietitian for help.  Remember that calories are listed per serving. If you choose to have more than one serving of a food, you will have to multiply the calories per serving by the amount of servings you plan to eat. For example, the label on a package of bread might say that a serving size is 1 slice and that there are 90 calories in a serving. If you eat 1 slice, you will have eaten 90 calories. If you eat 2 slices, you will have eaten 180 calories.  How do I keep a food log?  Immediately after each meal, record the following information in your food log:  · What you ate. Don't forget to include toppings, sauces, and other extras on the food.  · How much you ate. This can be measured in cups, ounces, or number of items.  · How many calories each food and drink had.  · The total number of calories in the meal.    Keep your food log near you, such as in a small notebook in your pocket, or use a mobile jennifer or website. Some programs will calculate calories for you and show you how many calories you have left for the day to meet your goal.  What are some calorie counting tips?  · Use your calories on foods and drinks that will fill you up and not leave you hungry:  ? Some examples of foods that fill you up are nuts and nut butters, vegetables, lean proteins, and high-fiber foods like whole grains. High-fiber foods are foods with more than 5 g fiber per serving.  ? Drinks such as sodas, specialty coffee drinks, alcohol, and juices have a lot of calories, yet do not fill you up.  · Eat nutritious foods and avoid empty calories. Empty calories are calories you get from foods or beverages that do not have many vitamins or protein, such as candy, sweets, and soda. It is better to  "have a nutritious high-calorie food (such as an avocado) than a food with few nutrients (such as a bag of chips).  · Know how many calories are in the foods you eat most often. This will help you calculate calorie counts faster.  · Pay attention to calories in drinks. Low-calorie drinks include water and unsweetened drinks.  · Pay attention to nutrition labels for \"low fat\" or \"fat free\" foods. These foods sometimes have the same amount of calories or more calories than the full fat versions. They also often have added sugar, starch, or salt, to make up for flavor that was removed with the fat.  · Find a way of tracking calories that works for you. Get creative. Try different apps or programs if writing down calories does not work for you.  What are some portion control tips?  · Know how many calories are in a serving. This will help you know how many servings of a certain food you can have.  · Use a measuring cup to measure serving sizes. You could also try weighing out portions on a kitchen scale. With time, you will be able to estimate serving sizes for some foods.  · Take some time to put servings of different foods on your favorite plates, bowls, and cups so you know what a serving looks like.  · Try not to eat straight from a bag or box. Doing this can lead to overeating. Put the amount you would like to eat in a cup or on a plate to make sure you are eating the right portion.  · Use smaller plates, glasses, and bowls to prevent overeating.  · Try not to multitask (for example, watch TV or use your computer) while eating. If it is time to eat, sit down at a table and enjoy your food. This will help you to know when you are full. It will also help you to be aware of what you are eating and how much you are eating.  What are tips for following this plan?  Reading food labels  · Check the calorie count compared to the serving size. The serving size may be smaller than what you are used to eating.  · Check the " source of the calories. Make sure the food you are eating is high in vitamins and protein and low in saturated and trans fats.  Shopping  · Read nutrition labels while you shop. This will help you make healthy decisions before you decide to purchase your food.  · Make a grocery list and stick to it.  Cooking  · Try to cook your favorite foods in a healthier way. For example, try baking instead of frying.  · Use low-fat dairy products.  Meal planning  · Use more fruits and vegetables. Half of your plate should be fruits and vegetables.  · Include lean proteins like poultry and fish.  How do I count calories when eating out?  · Ask for smaller portion sizes.  · Consider sharing an entree and sides instead of getting your own entree.  · If you get your own entree, eat only half. Ask for a box at the beginning of your meal and put the rest of your entree in it so you are not tempted to eat it.  · If calories are listed on the menu, choose the lower calorie options.  · Choose dishes that include vegetables, fruits, whole grains, low-fat dairy products, and lean protein.  · Choose items that are boiled, broiled, grilled, or steamed. Stay away from items that are buttered, battered, fried, or served with cream sauce. Items labeled “crispy” are usually fried, unless stated otherwise.  · Choose water, low-fat milk, unsweetened iced tea, or other drinks without added sugar. If you want an alcoholic beverage, choose a lower calorie option such as a glass of wine or light beer.  · Ask for dressings, sauces, and syrups on the side. These are usually high in calories, so you should limit the amount you eat.  · If you want a salad, choose a garden salad and ask for grilled meats. Avoid extra toppings like [place], cheese, or fried items. Ask for the dressing on the side, or ask for olive oil and vinegar or lemon to use as dressing.  · Estimate how many servings of a food you are given. For example, a serving of cooked rice is ½ cup  or about the size of half a baseball. Knowing serving sizes will help you be aware of how much food you are eating at restaurants. The list below tells you how big or small some common portion sizes are based on everyday objects:  ? 1 oz--4 stacked dice.  ? 3 oz--1 deck of cards.  ? 1 tsp--1 die.  ? 1 Tbsp--½ a ping-pong ball.  ? 2 Tbsp--1 ping-pong ball.  ? ½ cup--½ baseball.  ? 1 cup--1 baseball.  Summary  · Calorie counting means keeping track of how many calories you eat and drink each day. If you eat fewer calories than your body needs, you should lose weight.  · A healthy amount of weight to lose per week is usually 1-2 lb (0.5-0.9 kg). This usually means reducing your daily calorie intake by 500-750 calories.  · The number of calories in a food can be found on a Nutrition Facts label. If a food does not have a Nutrition Facts label, try to look up the calories online or ask your dietitian for help.  · Use your calories on foods and drinks that will fill you up, and not on foods and drinks that will leave you hungry.  · Use smaller plates, glasses, and bowls to prevent overeating.  This information is not intended to replace advice given to you by your health care provider. Make sure you discuss any questions you have with your health care provider.  Document Released: 12/18/2006 Document Revised: 11/17/2017 Document Reviewed: 11/17/2017  [employer] Interactive Patient Education © 2018 [employer]

## 2018-08-29 NOTE — PATIENT INSTRUCTIONS
Send [facility] home pacemaker reading on 11-29-18   Have INR checked at lab today  Fall precautions- will talk with Tara Trujillo about some PT for balance  Follow up in 6 mos With Dr. Saulo Cuevas   Call with any questions or concerns  Follow up with CORDELIA Valentin - CNP for non cardiac problems  Report any new problems  Cardiovascular Fitness-Exercise as tolerated. Cardiac / Healthy Diet  Continue current medications as directed  Continue plan of treatment  It is always recommended that you bring your medications bottles with you to each visit - this is for your safety! Patient Education        Preventing Falls: Care Instructions  Your Care Instructions    Getting around your home safely can be a challenge if you have injuries or health problems that make it easy for you to fall. Loose rugs and furniture in walkways are among the dangers for many older people who have problems walking or who have poor eyesight. People who have conditions such as arthritis, osteoporosis, or dementia also have to be careful not to fall. You can make your home safer with a few simple measures. Follow-up care is a key part of your treatment and safety. Be sure to make and go to all appointments, and call your doctor if you are having problems. It's also a good idea to know your test results and keep a list of the medicines you take. How can you care for yourself at home? Taking care of yourself  · You may get dizzy if you do not drink enough water. To prevent dehydration, drink plenty of fluids, enough so that your urine is light yellow or clear like water. Choose water and other caffeine-free clear liquids. If you have kidney, heart, or liver disease and have to limit fluids, talk with your doctor before you increase the amount of fluids you drink. · Exercise regularly to improve your strength, muscle tone, and balance. Walk if you can. Swimming may be a good choice if you cannot walk easily.   · Have your vision and hearing

## 2018-08-29 NOTE — PROGRESS NOTES
status: Never Smoker    Smokeless tobacco: Never Used    Alcohol use Yes      Comment: occasional use      Current Outpatient Prescriptions   Medication Sig Dispense Refill    warfarin (COUMADIN) 5 MG tablet Take one to two tablets by mouth daily. 180 tablet 3    metoprolol (LOPRESSOR) 50 MG tablet Take 0.5 tablets by mouth 2 times daily (Patient taking differently: Take 25 mg by mouth daily ) 90 tablet 3    finasteride (PROSCAR) 5 MG tablet Take 5 mg by mouth 2 times daily       levothyroxine (SYNTHROID) 100 MCG tablet Take 100 mcg by mouth daily       calcium carbonate (OSCAL) 500 MG TABS tablet Take 600 mg by mouth daily       alendronate (FOSAMAX) 70 MG tablet Take 70 mg by mouth every 7 days      ferrous sulfate 325 (65 FE) MG tablet Take 325 mg by mouth 2 times daily       amLODIPine (NORVASC) 10 MG tablet Take 1 tablet by mouth daily. 90 tablet 3     No current facility-administered medications for this visit. Allergies: Patient has no known allergies. Review of Systems  Constitutional  no significant activity change, appetite change, or unexpected weight change. No fever, chills or diaphoresis. No fatigue. HEENT  no significant rhinorrhea or epistaxis. No tinnitus or significant hearing loss. Eyes  no sudden vision change or amaurosis. Respiratory  no significant wheezing, stridor, apnea or cough. No dyspnea on exertion or shortness of breath. Cardiovascular  no exertional chest pain, orthopnea or PND. No sensation of arrhythmia or slow heart rate. No claudication   + leg edema. Gastrointestinal  no abdominal swelling or pain. No blood in stool. No severe constipation, diarrhea, nausea, or vomiting. Genitourinary  no difficulty urinating, dysuria, frequency, or urgency. No flank pain or hematuria. Musculoskeletal +gait disturbance- Using cane. .   Skin  no color change or rash. No pallor. No new surgical incision.   Neurologic  no speech difficulty, facial sustained tachycardic episodes   Mode VVIR at 60  VS 48%   52%  Next check in 3 months via [facility] home monitoring system      Blood pressure and heart rate control. Medical management includes CCB and beta blocker  Chronic anticoagulation with Coumadin. Unable to obtain INR and office today. Will send to lab    We had very long conversation today about fall precautions and risk with bleeding being on chronic anticoagulation. Encouraged him to use walker or cane. We also discussed that he is spending more time in bed and may be having some deconditioning. No evidence of heart rate or blood pressure causing dizziness. Upon conversation I think this is more an imbalance issue and not necessarily a dizziness issue. Discussed the possibility of talking with his PCP to get some physical therapy to help with balance. If following continues will need to consider stopping his anticoagulation despite chronic A. Fib   States taking medications as prescribed      Plan  Send [facility] home pacemaker reading on 11-29-18   Have INR checked at lab today  Fall precautions- will talk with Mariza Archibald about some PT for balance  Follow up in 6 mos With Dr. Adrián Alvarez   Call with any questions or concerns  Follow up with CORDELIA Sampson - CNP for non cardiac problems  Report any new problems  Cardiovascular Fitness-Exercise as tolerated. Cardiac / Healthy Diet  Continue current medications as directed  Continue plan of treatment  It is always recommended that you bring your medications bottles with you to each visit - this is for your safety!        CORDELIA Ochoa

## 2018-09-17 NOTE — PROGRESS NOTES
QUICK REFERENCE INFORMATION:  The ABCs of the Annual Wellness Visit    Subsequent Medicare Wellness Visit    HEALTH RISK ASSESSMENT    9/8/1924    Recent Hospitalizations:  No hospitalization(s) within the last year..    Chronic problems:   HTN: Currently takes amlodipine and metoprolol.  Takes medications as prescribed without missed doses, reports no episodes of hypotension or any additional adverse effects from medications(s); Patient reports BP's at home have been stable. Denies chest pain/chest pressure or discomfort, shortness of breath with exertion, headaches, palpitations, irregular heart beat, tachycardia, lower extremity edema, orthopnea, near-syncope and also has had recent falls     Current Medical Providers:  Patient Care Team:  Lisbet Whyte DNP, DELLA as PCP - General  Lisbet Whyte DNP, DELLA as PCP - Claims Attributed    The patient has a history of falls. I did complete a risk assessment for falls. A plan of care for falls was documented.    Smoking Status:  History   Smoking Status   • Never Smoker   Smokeless Tobacco   • Never Used     Alcohol Consumption:  History   Alcohol Use   • Yes     Comment: wine occassionally     Depression Screen:   PHQ-2/PHQ-9 Depression Screening 9/17/2018   Little interest or pleasure in doing things 0   Feeling down, depressed, or hopeless 0   Trouble falling or staying asleep, or sleeping too much -   Feeling tired or having little energy -   Poor appetite or overeating -   Feeling bad about yourself - or that you are a failure or have let yourself or your family down -   Trouble concentrating on things, such as reading the newspaper or watching television -   Moving or speaking so slowly that other people could have noticed. Or the opposite - being so fidgety or restless that you have been moving around a lot more than usual -   Thoughts that you would be better off dead, or of hurting yourself in some way -   Total Score 0       Health Habits and  Functional and Cognitive Screening:  Functional & Cognitive Status 9/17/2018   Do you have difficulty preparing food and eating? No   Do you have difficulty bathing yourself, getting dressed or grooming yourself? No   Do you have difficulty using the toilet? No   Do you have difficulty moving around from place to place? Yes   Do you have trouble with steps or getting out of a bed or a chair? Yes   In the past year have you fallen or experienced a near fall? Yes   Current Diet Well Balanced Diet   Dental Exam Up to date   Eye Exam Up to date   Exercise (times per week) 7 times per week   Current Exercise Activities Include Walking   Do you need help using the phone?  No   Are you deaf or do you have serious difficulty hearing?  Yes   Do you need help with transportation? Yes   Do you need help shopping? No   Do you need help preparing meals?  Yes   Do you need help with housework?  No   Do you need help with laundry? No   Do you need help taking your medications? Yes   Do you need help managing money? No   Have you felt unusual stress, anger or loneliness in the last month? No   Who do you live with? Spouse   If you need help, do you have trouble finding someone available to you? No   Have you been bothered in the last four weeks by sexual problems? No   Do you have difficulty concentrating, remembering or making decisions? Yes   Does the patient have evidence of cognitive impairment? No    Aspirin use counseling: Taking ASA appropriately as indicated      Recent Lab Results:  CMP:  Lab Results   Component Value Date     (H) 07/27/2018    BUN 19 07/27/2018    CREATININE 0.92 07/27/2018    EGFRIFNONA 77 07/27/2018    EGFRIFAFRI 93 07/27/2018    BCR 20.7 07/27/2018     07/27/2018    K 4.4 07/27/2018    CO2 31.0 07/27/2018    CALCIUM 10.1 07/27/2018    PROTENTOTREF 8.3 07/27/2018    ALBUMIN 5.00 07/27/2018    LABGLOBREF 3.3 07/27/2018    LABIL2 1.5 07/27/2018    BILITOT 1.1 (H) 07/27/2018    ALKPHOS 71  07/27/2018    AST 34 07/27/2018    ALT 27 07/27/2018     Lipid Panel:  Lab Results   Component Value Date    TRIG 79 07/27/2018    HDL 55 07/27/2018    VLDL 15.8 07/27/2018    LDLHDL 1.8 05/28/2015          Visual Acuity:  No exam data present    Age-appropriate Screening Schedule:  Refer to the list below for future screening recommendations based on patient's age, sex and/or medical conditions. Orders for these recommended tests are listed in the plan section. The patient has been provided with a written plan.    Health Maintenance   Topic Date Due   • ZOSTER VACCINE (1 of 2) 09/08/1974   • DXA SCAN  08/11/2017   • PNEUMOCOCCAL VACCINES (65+ LOW/MEDIUM RISK) (2 of 2 - PPSV23) 10/03/2017   • INFLUENZA VACCINE  08/01/2018   • LIPID PANEL  07/27/2019   • COLONOSCOPY  05/24/2022   • TDAP/TD VACCINES (2 - Td) 01/29/2028        Subjective   History of Present Illness    Sam Farr is a 89 y.o. male who presents for an Subsequent Wellness Visit.    The following portions of the patient's history were reviewed and updated as appropriate: allergies, current medications, past family history, past medical history, past social history, past surgical history and problem list.    Outpatient Medications Prior to Visit   Medication Sig Dispense Refill   • alendronate (FOSAMAX) 70 MG tablet Take 1 tablet by mouth Every 7 (Seven) Days. 12 tablet 2   • amLODIPine (NORVASC) 10 MG tablet Take 1 tablet by mouth Daily. 90 tablet 1   • Calcium-Vitamin D (CALTRATE 600 PLUS-VIT D PO) Take  by mouth daily.     • dutasteride (AVODART) 0.5 MG capsule Take 1 capsule by mouth Daily. 90 capsule 1   • ferrous sulfate 325 (65 FE) MG tablet Take 1 tablet by mouth Daily With Breakfast. 90 tablet 1   • finasteride (PROSCAR) 5 MG tablet Take 1 tablet by mouth Daily. 20 tablet 0   • levothyroxine (SYNTHROID, LEVOTHROID) 100 MCG tablet Take 1 tablet by mouth Daily. 90 tablet 1   • metoprolol tartrate (LOPRESSOR) 50 MG tablet Take 1/2 tab twice  daily. 90 tablet 1   • oxybutynin XL (DITROPAN-XL) 5 MG 24 hr tablet Take 1 tablet by mouth Every Night. 90 tablet 1   • warfarin (COUMADIN) 5 MG tablet Take 1 tablet by mouth Daily. Dr. Small manages coumadin. 90 tablet 1     No facility-administered medications prior to visit.        Patient Active Problem List   Diagnosis   • Atrial fibrillation (CMS/HCC)   • Chronic coronary artery disease   • Gastroesophageal reflux disease   • Hypertension   • Pacemaker   • Colon cancer (CMS/HCC)   • Diverticula of colon   • Hiatal hernia   • Inguinal hernia   • Adrenal adenoma   • Enlarged prostate   • Elevated prostate specific antigen (PSA)   • BPH (benign prostatic hypertrophy) with urinary obstruction   • Acute cystitis without hematuria   • OAB (overactive bladder)   • Benign prostatic hyperplasia with lower urinary tract symptoms   • Iris atrophy   • Ischemic optic neuropathy   • Hordeolum internum   • Pseudophakia       Advance Care Planning:  has NO advance directive - not interested in additional information    Identification of Risk Factors:  Risk factors include: cardiovascular risk, increased fall risk, vision limitations, hearing limitations and polypharmacy.    Review of Systems   Constitutional: Negative for activity change, chills and fatigue.   HENT: Negative.    Respiratory: Negative for cough, choking and chest tightness.    Cardiovascular: Negative for chest pain, palpitations and leg swelling.   Gastrointestinal: Negative for abdominal distention, abdominal pain and anal bleeding.   Endocrine: Negative.    Musculoskeletal: Positive for arthralgias and myalgias.   Skin: Negative.    Neurological: Negative.    Psychiatric/Behavioral: Negative for agitation, behavioral problems and confusion.   All other systems reviewed and are negative.      Compared to one year ago, the patient feels his physical health is the same.  Compared to one year ago, the patient feels his mental health is the same.    Objective      Physical Exam   Constitutional: He is oriented to person, place, and time. Vital signs are normal. He appears well-developed and well-nourished. He is cooperative.   HENT:   Head: Normocephalic and atraumatic.   Right Ear: Tympanic membrane, external ear and ear canal normal. Decreased hearing is noted.   Left Ear: Tympanic membrane, external ear and ear canal normal. Decreased hearing is noted.   Nose: Nose normal.   Mouth/Throat: Uvula is midline, oropharynx is clear and moist and mucous membranes are normal.   Eyes: Pupils are equal, round, and reactive to light. Conjunctivae, EOM and lids are normal.   Neck: Trachea normal, normal range of motion and full passive range of motion without pain. Neck supple. No thyroid mass and no thyromegaly present.   Cardiovascular: Normal rate, regular rhythm, normal heart sounds and normal pulses.    Pulmonary/Chest: Effort normal and breath sounds normal.   Abdominal: Soft. Normal appearance and bowel sounds are normal.   Musculoskeletal: Normal range of motion.       Neurological Sensory Findings - Unaltered hot/cold right ankle/foot discrimination and unaltered hot/cold left ankle/foot discrimination. Unaltered sharp/dull right ankle/foot discrimination and unaltered sharp/dull left ankle/foot discrimination. No right ankle/foot altered proprioception and no left ankle/foot altered proprioception  Vascular Status -  His right foot exhibits normal foot vasculature  and no edema. His left foot exhibits normal foot vasculature  and no edema.  Lymphadenopathy:        Head (right side): No submental, no submandibular and no tonsillar adenopathy present.        Head (left side): No submental, no submandibular and no tonsillar adenopathy present.     He has no cervical adenopathy.   Neurological: He is alert and oriented to person, place, and time. He has normal strength. No cranial nerve deficit or sensory deficit. He displays a negative Romberg sign.   Skin: Skin is warm and  "dry.   Psychiatric: He has a normal mood and affect. His speech is normal and behavior is normal. Judgment and thought content normal. Cognition and memory are normal.   Nursing note and vitals reviewed.      Vitals:    18 1503   BP: 122/70   BP Location: Left arm   Patient Position: Sitting   Cuff Size: Adult   Pulse: 112   Resp: 24   Temp: 97.9 °F (36.6 °C)   TempSrc: Oral   SpO2: 95%   Weight: 76.5 kg (168 lb 9.6 oz)   Height: 167.6 cm (65.98\")   PainSc:   3   PainLoc: Back       Patient's Body mass index is 27.23 kg/m². BMI is within normal parameters. No follow-up required.      Assessment/Plan   Patient Self-Management and Personalized Health Advice  The patient has been provided with information about: diet, exercise and fall prevention and preventive services including:   · Advance directive: does not have one and does not want information  · Colorectal cancer screenin17  · Fall Risk assessment done:  Addendum:  19, pt has a risk of falls, it was marked no risks however that was incorrect. (Lisbet Whyte, DNP)   ·   · Influenza vaccine:  Wants to wait until oct  · Pneumococcal vaccine: declines  · Prostate cancer screening:  done.    Visit Diagnoses:    ICD-10-CM ICD-9-CM   1. Medicare annual wellness visit, subsequent Z00.00 V70.0       No orders of the defined types were placed in this encounter.  Will return for flu vaccine next month.     Outpatient Encounter Prescriptions as of 2018   Medication Sig Dispense Refill   • alendronate (FOSAMAX) 70 MG tablet Take 1 tablet by mouth Every 7 (Seven) Days. 12 tablet 2   • amLODIPine (NORVASC) 10 MG tablet Take 1 tablet by mouth Daily. 90 tablet 1   • Calcium-Vitamin D (CALTRATE 600 PLUS-VIT D PO) Take  by mouth daily.     • dutasteride (AVODART) 0.5 MG capsule Take 1 capsule by mouth Daily. 90 capsule 1   • ferrous sulfate 325 (65 FE) MG tablet Take 1 tablet by mouth Daily With Breakfast. 90 tablet 1   • finasteride (PROSCAR) 5 MG tablet " Take 1 tablet by mouth Daily. 20 tablet 0   • levothyroxine (SYNTHROID, LEVOTHROID) 100 MCG tablet Take 1 tablet by mouth Daily. 90 tablet 1   • metoprolol tartrate (LOPRESSOR) 50 MG tablet Take 1/2 tab twice daily. 90 tablet 1   • oxybutynin XL (DITROPAN-XL) 5 MG 24 hr tablet Take 1 tablet by mouth Every Night. 90 tablet 1   • warfarin (COUMADIN) 5 MG tablet Take 1 tablet by mouth Daily. Dr. Small manages coumadin. 90 tablet 1     No facility-administered encounter medications on file as of 9/17/2018.        Reviewed use of high risk medication in the elderly: yes  Reviewed for potential of harmful drug interactions in the elderly: yes    ASSESSMENT  Sam was seen today for annual exam.    Diagnoses and all orders for this visit:    Medicare annual wellness visit, subsequent    Return for flu vaccine in oct    Follow Up:  Return in about 1 year (around 9/17/2019) for Medicare Wellness.     An After Visit Summary and PPPS with all of these plans were given to the patient.        Lisbet Whyte, SHARRON, APRN-BC  09/17/2018

## 2018-09-28 NOTE — PROGRESS NOTES
[facility]  HEMATOLOGY & ONCOLOGY    Cancer Staging Information:  No matching staging information was found for the patient.      Subjective     VISIT DIAGNOSIS:   No diagnosis found.    REASON FOR VISIT:     Chief Complaint   Patient presents with   • Follow-up     Colon Ca: He is here for f/u visit today        HEMATOLOGY / ONCOLOGY HISTORY:    No history exists.           INTERVAL HISTORY  Patient ID: Sam Farr is a 89 y.o. year old male with a lot of comorbidities, bilateral disease, hypertension, hyperlipidemia, HFA on warfarin, hypothyroidism, who is here to follow-up on his stage III colon cancer.  Given his age, patient declined chemotherapy.  He is under observation.  He denies diarrhea or constipation, denies melena or bright red blood per rectum.  He did have a couple of nosebleeds 2 weeks ago that was just two drops and then resolved.  He did not seek medical attention.  -- no new issues. Chronic dizziness. Work-up negative.  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     Social History Main Topics   • Smoking status: Never Smoker   • Smokeless  tobacco: Never Used   • Alcohol use Yes      Comment: wine occassionally   • Drug use: No   • Sexual activity: Defer     Other Topics Concern   • Not on file     Social History Narrative   • No narrative on file     Family History:   Family History   Problem Relation Age of Onset   • Colon cancer Mother    • Heart attack Father    • COPD Sister    • No Known Problems Daughter    • Cancer Son    • Appendicitis Maternal Grandmother    • Heart attack Maternal Grandfather    • No Known Problems Paternal Grandmother    • No Known Problems Paternal Grandfather    • No Known Problems Sister    • No Known Problems Sister    • No Known Problems Sister    • Heart attack Son    • No Known Problems Son    • No Known Problems Son    • No Known Problems Daughter    • No Known Problems Daughter    • No Known Problems Daughter        Review of Systems   Constitutional: Negative.    HENT: Positive for hearing loss and nosebleeds.    Respiratory: Negative.    Cardiovascular: Negative.    Gastrointestinal: Negative.    Endocrine: Negative.    Genitourinary: Negative.    Musculoskeletal: Negative.    Skin: Negative.    Allergic/Immunologic: Negative.    Neurological: Positive for dizziness.   Hematological: Bruises/bleeds easily.   Psychiatric/Behavioral: Negative.         Performance Status:  Asymptomatic    Medications:    Current Outpatient Prescriptions   Medication Sig Dispense Refill   • alendronate (FOSAMAX) 70 MG tablet Take 1 tablet by mouth Every 7 (Seven) Days. 12 tablet 2   • amLODIPine (NORVASC) 10 MG tablet Take 1 tablet by mouth Daily. 90 tablet 1   • Calcium-Vitamin D (CALTRATE 600 PLUS-VIT D PO) Take  by mouth daily.     • dutasteride (AVODART) 0.5 MG capsule Take 1 capsule by mouth Daily. 90 capsule 1   • ferrous sulfate 325 (65 FE) MG tablet Take 1 tablet by mouth Daily With Breakfast. 90 tablet 1   • finasteride (PROSCAR) 5 MG tablet Take 1 tablet by mouth Daily. 20 tablet 0   • levothyroxine (SYNTHROID, LEVOTHROID)  "100 MCG tablet Take 1 tablet by mouth Daily. 90 tablet 1   • metoprolol tartrate (LOPRESSOR) 50 MG tablet Take 1/2 tab twice daily. 90 tablet 1   • oxybutynin XL (DITROPAN-XL) 5 MG 24 hr tablet Take 1 tablet by mouth Every Night. 90 tablet 1   • warfarin (COUMADIN) 5 MG tablet Take 1 tablet by mouth Daily. Dr. Small manages coumadin. 90 tablet 1     No current facility-administered medications for this visit.        ALLERGIES:  No Known Allergies    Objective      Vitals:    09/28/18 0935   BP: 122/68   Pulse: 102   Resp: 18   Temp: 97.3 °F (36.3 °C)   TempSrc: Tympanic   SpO2: 93%   Weight: 75.9 kg (167 lb 6.4 oz)   Height: 167.6 cm (65.98\")         Current Status 9/28/2018   ECOG score 2         Physical Exam      General Appearance: Patient is awake, alert, oriented and in no acute distress. Patient is welldeveloped, wellnourished, and appears stated age.  HEENT: Normocephalic. Sclerae clear, conjunctiva pink, extraocular movements intact, pupils, round, reactive to light and  accommodation. Mouth and throat are clear with moist oral mucosa.  NECK: Supple, no jugular venous distention, thyroid not enlarged.  LYMPH: No cervical, supraclavicular, axillary, or inguinal lymphadenopathy.  CHEST: Equal bilateral expansion, AP  diameter normal, resonant percussion note  LUNGS: Good air movement, no rales, rhonchi, rubs or wheezes with auscultation  CARDIO: Regular sinus rhythm, no murmurs, gallops or rubs.  ABDOMEN: Nondistended, soft, No tenderness, no guarding, no rebound, No hepatosplenomegaly. No abdominal masses. Bowel sounds positive. No hernia  GENITALIA: Not examined.  BREASTS: Not examined.  MUSKEL: No joint swelling, decreased motion, or inflammation  EXTREMS: Bruises on the bilateral extensor surface of upper extremity.  No edema, clubbing, cyanosis, No varicose veins.  NEURO: Grossly nonfocal, Gait is coordinated and smooth, Cognition is preserved.  SKIN: No rashes, no ecchymoses, no petechia.  PSYCH: " Oriented to time, place and person. Memory is preserved. Mood and affect appear normal    RECENT LABS:  Orders Only on 09/27/2018   Component Date Value Ref Range Status   • WBC 09/28/2018 9.34  4.80 - 10.80 10*3/mm3 Final   • RBC 09/28/2018 4.89  4.80 - 5.90 10*6/mm3 Final   • Hemoglobin 09/28/2018 15.7  14.0 - 18.0 g/dL Final   • Hematocrit 09/28/2018 46.0  40.0 - 52.0 % Final   • MCV 09/28/2018 94.1  82.0 - 95.0 fL Final   • MCH 09/28/2018 32.1* 28.0 - 32.0 pg Final   • MCHC 09/28/2018 34.1  33.0 - 36.0 g/dL Final   • RDW 09/28/2018 13.4  12.0 - 15.0 % Final   • RDW-SD 09/28/2018 46.4  40.0 - 54.0 fl Final   • MPV 09/28/2018 9.2  6.0 - 12.0 fL Final   • Platelets 09/28/2018 263  130 - 400 10*3/mm3 Final   • Neutrophil % 09/28/2018 55.9  39.0 - 78.0 % Final   • Lymphocyte % 09/28/2018 31.2  15.0 - 45.0 % Final   • Monocyte % 09/28/2018 11.0  4.0 - 12.0 % Final   • Eosinophil % 09/28/2018 0.9  0.0 - 4.0 % Final   • Basophil % 09/28/2018 0.4  0.0 - 2.0 % Final   • Immature Grans % 09/28/2018 0.6  0.0 - 5.0 % Final   • Neutrophils, Absolute 09/28/2018 5.22  1.87 - 8.40 10*3/mm3 Final   • Lymphocytes, Absolute 09/28/2018 2.91  0.72 - 4.86 10*3/mm3 Final   • Monocytes, Absolute 09/28/2018 1.03  0.19 - 1.30 10*3/mm3 Final   • Eosinophils, Absolute 09/28/2018 0.08  0.00 - 0.70 10*3/mm3 Final   • Basophils, Absolute 09/28/2018 0.04  0.00 - 0.20 10*3/mm3 Final   • Immature Grans, Absolute 09/28/2018 0.06* 0.00 - 0.03 10*3/mm3 Final   • nRBC 09/28/2018 0.0  0.0 - 0.0 /100 WBC Final       RADIOLOGY:  No results found.         Assessment/Plan  Sam Farr is a 89 y.o. year old male with a lot of comorbidities, atrial fibrillation with pacemaker, on warfarin, coronary artery disease, hypothyroidism, hypertension, diverticula disease, an large prostate, with stage III colon cancer on observation here for follow-up.    Patient Active Problem List   Diagnosis   • Atrial fibrillation (CMS/HCC)   • Chronic coronary artery  disease   • Gastroesophageal reflux disease   • Hypertension   • Pacemaker   • Colon cancer (CMS/HCC)   • Diverticula of colon   • Hiatal hernia   • Inguinal hernia   • Adrenal adenoma   • Enlarged prostate   • Elevated prostate specific antigen (PSA)   • BPH (benign prostatic hypertrophy) with urinary obstruction   • Acute cystitis without hematuria   • OAB (overactive bladder)   • Benign prostatic hyperplasia with lower urinary tract symptoms   • Iris atrophy   • Ischemic optic neuropathy   • Hordeolum internum   • Pseudophakia        1. Stage III colon cancer: Colon Ca, 5/11+ LN, S/P resection. Because of age, he did not consider any chemo. Currently on OBSERVATION. Recent CT scan 11/2016: Indeterminate 7mm lesion in L pelvic area. Indeterminate L4 lesion And enlarged Prostate with NL PSA .  He had colonoscopy due to GI bleeding in May, there was an adenomatious polyp that was resected.  Currently no melena or bright red per rectum. last CEA normal does not appear to have any disease recurrence we will follow CEA today and act accordingly.  We will see him back in 6 months per his request.    2.  Benign prostatic hyperplasia: Follows urology, Eduard Grigsby MD is his physician. On finasteride    3.  Atrial fibrillation on Coumadin.  Advised patient to seek medical attention should he develope nosebleed, easy bruising, purpuric rash as that might be sign of supratherapeutic INR.  4. Hypothyroidism: On synthroid  5. Stress incontinent. On oxybutinin XL  6. Osteoporosis: on fosamax    Obiageli Kaur Lockwood MD    9/28/2018    9:55 AM

## 2018-11-26 PROBLEM — S06.360A TRAUMATIC HEMORRHAGE OF CEREBRUM WITHOUT LOSS OF CONSCIOUSNESS (HCC): Status: ACTIVE | Noted: 2018-01-01

## 2018-11-26 NOTE — TELEPHONE ENCOUNTER
Patient wife called states that patient got up this am with confusion and was stumbling and very unsteady she was requesting that an antibiotic be called in for a possible UTI I explained that patient is on blood thinner and that he had a recent fall with trauma to the head and that if he was very confused and stumbling he would need evaluated STAT she was going to drive the patient to [facility] ER for further evaluation.

## 2018-11-30 PROBLEM — I61.5: Status: ACTIVE | Noted: 2018-01-01

## 2018-12-01 NOTE — OUTREACH NOTE
Prep Survey      Responses   Facility patient discharged from?  [facility]   Is patient eligible?  No   What are the reasons patient is not eligible?  [facility]   Does the patient have one of the following disease processes/diagnoses(primary or secondary)?  Other   Prep survey completed?  Yes          nAa Boudreaux RN